# Patient Record
Sex: FEMALE | Race: WHITE | NOT HISPANIC OR LATINO | Employment: FULL TIME | URBAN - NONMETROPOLITAN AREA
[De-identification: names, ages, dates, MRNs, and addresses within clinical notes are randomized per-mention and may not be internally consistent; named-entity substitution may affect disease eponyms.]

---

## 2020-02-21 ENCOUNTER — APPOINTMENT (EMERGENCY)
Dept: CT IMAGING | Facility: HOSPITAL | Age: 60
DRG: 392 | End: 2020-02-21
Payer: COMMERCIAL

## 2020-02-21 ENCOUNTER — HOSPITAL ENCOUNTER (INPATIENT)
Facility: HOSPITAL | Age: 60
LOS: 6 days | Discharge: HOME | DRG: 392 | End: 2020-02-27
Attending: EMERGENCY MEDICINE | Admitting: FAMILY MEDICINE
Payer: COMMERCIAL

## 2020-02-21 DIAGNOSIS — K57.92 DIVERTICULITIS: ICD-10-CM

## 2020-02-21 DIAGNOSIS — D72.829 LEUKOCYTOSIS, UNSPECIFIED TYPE: ICD-10-CM

## 2020-02-21 DIAGNOSIS — R10.32 LEFT LOWER QUADRANT ABDOMINAL PAIN: Primary | ICD-10-CM

## 2020-02-21 DIAGNOSIS — K57.32 SIGMOID DIVERTICULITIS: ICD-10-CM

## 2020-02-21 PROBLEM — R11.0 NAUSEA: Status: ACTIVE | Noted: 2020-02-21

## 2020-02-21 PROBLEM — D72.825 BANDEMIA: Status: ACTIVE | Noted: 2020-02-21

## 2020-02-21 PROBLEM — M48.00 SPINAL STENOSIS: Status: ACTIVE | Noted: 2020-02-21

## 2020-02-21 PROBLEM — M96.1 POST LAMINECTOMY SYNDROME: Status: ACTIVE | Noted: 2020-02-21

## 2020-02-21 LAB
ALBUMIN SERPL BCP-MCNC: 4 G/DL (ref 3.5–5)
ALP SERPL-CCNC: 53 U/L (ref 46–116)
ALT SERPL W P-5'-P-CCNC: 20 U/L (ref 12–78)
AMYLASE SERPL-CCNC: 36 IU/L (ref 25–115)
ANION GAP SERPL CALCULATED.3IONS-SCNC: 8 MMOL/L (ref 4–13)
AST SERPL W P-5'-P-CCNC: 13 U/L (ref 5–45)
BACTERIA UR QL AUTO: NORMAL /HPF
BASOPHILS # BLD AUTO: 0.03 THOUSANDS/ΜL (ref 0–0.1)
BASOPHILS NFR BLD AUTO: 0 % (ref 0–1)
BILIRUB SERPL-MCNC: 0.36 MG/DL (ref 0.2–1)
BILIRUB UR QL STRIP: NEGATIVE
BUN SERPL-MCNC: 14 MG/DL (ref 5–25)
CALCIUM SERPL-MCNC: 8.7 MG/DL (ref 8.3–10.1)
CHLORIDE SERPL-SCNC: 103 MMOL/L (ref 100–108)
CLARITY UR: CLEAR
CO2 SERPL-SCNC: 30 MMOL/L (ref 21–32)
COLOR UR: YELLOW
CREAT SERPL-MCNC: 0.78 MG/DL (ref 0.6–1.3)
EOSINOPHIL # BLD AUTO: 0.01 THOUSAND/ΜL (ref 0–0.61)
EOSINOPHIL NFR BLD AUTO: 0 % (ref 0–6)
ERYTHROCYTE [DISTWIDTH] IN BLOOD BY AUTOMATED COUNT: 11.8 % (ref 11.6–15.1)
GFR SERPL CREATININE-BSD FRML MDRD: 83 ML/MIN/1.73SQ M
GLUCOSE SERPL-MCNC: 107 MG/DL (ref 65–140)
GLUCOSE UR STRIP-MCNC: NEGATIVE MG/DL
HCT VFR BLD AUTO: 46.4 % (ref 34.8–46.1)
HGB BLD-MCNC: 15.7 G/DL (ref 11.5–15.4)
HGB UR QL STRIP.AUTO: ABNORMAL
IMM GRANULOCYTES # BLD AUTO: 0.06 THOUSAND/UL (ref 0–0.2)
IMM GRANULOCYTES NFR BLD AUTO: 0 % (ref 0–2)
INR PPP: 0.91 (ref 0.84–1.19)
KETONES UR STRIP-MCNC: NEGATIVE MG/DL
LACTATE SERPL-SCNC: 1.9 MMOL/L (ref 0.5–2)
LEUKOCYTE ESTERASE UR QL STRIP: NEGATIVE
LIPASE SERPL-CCNC: 159 U/L (ref 73–393)
LYMPHOCYTES # BLD AUTO: 1.01 THOUSANDS/ΜL (ref 0.6–4.47)
LYMPHOCYTES NFR BLD AUTO: 7 % (ref 14–44)
MCH RBC QN AUTO: 31.3 PG (ref 26.8–34.3)
MCHC RBC AUTO-ENTMCNC: 33.8 G/DL (ref 31.4–37.4)
MCV RBC AUTO: 93 FL (ref 82–98)
MONOCYTES # BLD AUTO: 0.83 THOUSAND/ΜL (ref 0.17–1.22)
MONOCYTES NFR BLD AUTO: 6 % (ref 4–12)
NEUTROPHILS # BLD AUTO: 12.46 THOUSANDS/ΜL (ref 1.85–7.62)
NEUTS SEG NFR BLD AUTO: 87 % (ref 43–75)
NITRITE UR QL STRIP: NEGATIVE
NON-SQ EPI CELLS URNS QL MICRO: NORMAL /HPF
NRBC BLD AUTO-RTO: 0 /100 WBCS
PH UR STRIP.AUTO: 6 [PH]
PLATELET # BLD AUTO: 285 THOUSANDS/UL (ref 149–390)
PMV BLD AUTO: 9.4 FL (ref 8.9–12.7)
POTASSIUM SERPL-SCNC: 4 MMOL/L (ref 3.5–5.3)
PROT SERPL-MCNC: 7.5 G/DL (ref 6.4–8.2)
PROT UR STRIP-MCNC: NEGATIVE MG/DL
PROTHROMBIN TIME: 12.2 SECONDS (ref 11.6–14.5)
RBC # BLD AUTO: 5.01 MILLION/UL (ref 3.81–5.12)
RBC #/AREA URNS AUTO: NORMAL /HPF
SODIUM SERPL-SCNC: 141 MMOL/L (ref 136–145)
SP GR UR STRIP.AUTO: 1.01 (ref 1–1.03)
UROBILINOGEN UR QL STRIP.AUTO: 0.2 E.U./DL
WBC # BLD AUTO: 14.4 THOUSAND/UL (ref 4.31–10.16)
WBC #/AREA URNS AUTO: NORMAL /HPF

## 2020-02-21 PROCEDURE — 83605 ASSAY OF LACTIC ACID: CPT | Performed by: EMERGENCY MEDICINE

## 2020-02-21 PROCEDURE — 74177 CT ABD & PELVIS W/CONTRAST: CPT

## 2020-02-21 PROCEDURE — 36415 COLL VENOUS BLD VENIPUNCTURE: CPT

## 2020-02-21 PROCEDURE — C9113 INJ PANTOPRAZOLE SODIUM, VIA: HCPCS | Performed by: FAMILY MEDICINE

## 2020-02-21 PROCEDURE — 85610 PROTHROMBIN TIME: CPT | Performed by: EMERGENCY MEDICINE

## 2020-02-21 PROCEDURE — 80053 COMPREHEN METABOLIC PANEL: CPT

## 2020-02-21 PROCEDURE — 85025 COMPLETE CBC W/AUTO DIFF WBC: CPT

## 2020-02-21 PROCEDURE — 82150 ASSAY OF AMYLASE: CPT | Performed by: FAMILY MEDICINE

## 2020-02-21 PROCEDURE — 99222 1ST HOSP IP/OBS MODERATE 55: CPT | Performed by: FAMILY MEDICINE

## 2020-02-21 PROCEDURE — 96375 TX/PRO/DX INJ NEW DRUG ADDON: CPT

## 2020-02-21 PROCEDURE — 99285 EMERGENCY DEPT VISIT HI MDM: CPT | Performed by: EMERGENCY MEDICINE

## 2020-02-21 PROCEDURE — 83690 ASSAY OF LIPASE: CPT

## 2020-02-21 PROCEDURE — 99285 EMERGENCY DEPT VISIT HI MDM: CPT

## 2020-02-21 PROCEDURE — 81001 URINALYSIS AUTO W/SCOPE: CPT

## 2020-02-21 PROCEDURE — 96365 THER/PROPH/DIAG IV INF INIT: CPT

## 2020-02-21 PROCEDURE — 96361 HYDRATE IV INFUSION ADD-ON: CPT

## 2020-02-21 RX ORDER — SODIUM CHLORIDE 9 MG/ML
125 INJECTION, SOLUTION INTRAVENOUS CONTINUOUS
Status: DISCONTINUED | OUTPATIENT
Start: 2020-02-21 | End: 2020-02-26

## 2020-02-21 RX ORDER — ONDANSETRON 2 MG/ML
4 INJECTION INTRAMUSCULAR; INTRAVENOUS ONCE AS NEEDED
Status: DISCONTINUED | OUTPATIENT
Start: 2020-02-21 | End: 2020-02-23

## 2020-02-21 RX ORDER — ONDANSETRON 2 MG/ML
4 INJECTION INTRAMUSCULAR; INTRAVENOUS EVERY 6 HOURS PRN
Status: DISCONTINUED | OUTPATIENT
Start: 2020-02-21 | End: 2020-02-27 | Stop reason: HOSPADM

## 2020-02-21 RX ORDER — OXYCODONE HYDROCHLORIDE 5 MG/1
10 TABLET ORAL DAILY
COMMUNITY
Start: 2020-01-30 | End: 2020-07-17

## 2020-02-21 RX ORDER — MORPHINE SULFATE 4 MG/ML
4 INJECTION, SOLUTION INTRAMUSCULAR; INTRAVENOUS EVERY 6 HOURS PRN
Status: COMPLETED | OUTPATIENT
Start: 2020-02-21 | End: 2020-02-22

## 2020-02-21 RX ORDER — SODIUM CHLORIDE 9 MG/ML
75 INJECTION, SOLUTION INTRAVENOUS CONTINUOUS
Status: DISCONTINUED | OUTPATIENT
Start: 2020-02-21 | End: 2020-02-21 | Stop reason: SDUPTHER

## 2020-02-21 RX ORDER — CIPROFLOXACIN 2 MG/ML
400 INJECTION, SOLUTION INTRAVENOUS ONCE
Status: COMPLETED | OUTPATIENT
Start: 2020-02-21 | End: 2020-02-21

## 2020-02-21 RX ORDER — PANTOPRAZOLE SODIUM 40 MG/1
40 INJECTION, POWDER, FOR SOLUTION INTRAVENOUS
Status: DISCONTINUED | OUTPATIENT
Start: 2020-02-21 | End: 2020-02-27

## 2020-02-21 RX ORDER — ACETAMINOPHEN 325 MG/1
650 TABLET ORAL EVERY 6 HOURS PRN
Status: DISCONTINUED | OUTPATIENT
Start: 2020-02-21 | End: 2020-02-27 | Stop reason: HOSPADM

## 2020-02-21 RX ORDER — OXYCODONE HYDROCHLORIDE 10 MG/1
10 TABLET ORAL DAILY
Status: DISCONTINUED | OUTPATIENT
Start: 2020-02-21 | End: 2020-02-27 | Stop reason: HOSPADM

## 2020-02-21 RX ORDER — ONDANSETRON 2 MG/ML
4 INJECTION INTRAMUSCULAR; INTRAVENOUS ONCE
Status: COMPLETED | OUTPATIENT
Start: 2020-02-21 | End: 2020-02-21

## 2020-02-21 RX ORDER — MORPHINE SULFATE 4 MG/ML
4 INJECTION, SOLUTION INTRAMUSCULAR; INTRAVENOUS ONCE
Status: COMPLETED | OUTPATIENT
Start: 2020-02-21 | End: 2020-02-21

## 2020-02-21 RX ORDER — OXYCODONE HYDROCHLORIDE 5 MG/1
5 TABLET ORAL ONCE
Status: COMPLETED | OUTPATIENT
Start: 2020-02-21 | End: 2020-02-21

## 2020-02-21 RX ADMIN — MORPHINE SULFATE 4 MG: 4 INJECTION INTRAVENOUS at 09:38

## 2020-02-21 RX ADMIN — ONDANSETRON 4 MG: 2 INJECTION INTRAMUSCULAR; INTRAVENOUS at 09:38

## 2020-02-21 RX ADMIN — SODIUM CHLORIDE 125 ML/HR: 0.9 INJECTION, SOLUTION INTRAVENOUS at 23:50

## 2020-02-21 RX ADMIN — METRONIDAZOLE 500 MG: 500 INJECTION, SOLUTION INTRAVENOUS at 14:35

## 2020-02-21 RX ADMIN — ENOXAPARIN SODIUM 40 MG: 40 INJECTION SUBCUTANEOUS at 14:36

## 2020-02-21 RX ADMIN — IOHEXOL 100 ML: 350 INJECTION, SOLUTION INTRAVENOUS at 10:09

## 2020-02-21 RX ADMIN — CIPROFLOXACIN 400 MG: 2 INJECTION, SOLUTION INTRAVENOUS at 11:13

## 2020-02-21 RX ADMIN — PIPERACILLIN AND TAZOBACTAM 3.38 G: 3; .375 INJECTION, POWDER, LYOPHILIZED, FOR SOLUTION INTRAVENOUS at 17:06

## 2020-02-21 RX ADMIN — OXYCODONE HYDROCHLORIDE 5 MG: 5 TABLET ORAL at 21:56

## 2020-02-21 RX ADMIN — SODIUM CHLORIDE 1000 ML: 0.9 INJECTION, SOLUTION INTRAVENOUS at 11:12

## 2020-02-21 RX ADMIN — PIPERACILLIN AND TAZOBACTAM 3.38 G: 3; .375 INJECTION, POWDER, LYOPHILIZED, FOR SOLUTION INTRAVENOUS at 23:50

## 2020-02-21 RX ADMIN — METRONIDAZOLE 500 MG: 500 INJECTION, SOLUTION INTRAVENOUS at 20:55

## 2020-02-21 RX ADMIN — OXYCODONE HYDROCHLORIDE 10 MG: 10 TABLET ORAL at 14:36

## 2020-02-21 RX ADMIN — SODIUM CHLORIDE 1000 ML: 0.9 INJECTION, SOLUTION INTRAVENOUS at 09:37

## 2020-02-21 RX ADMIN — SODIUM CHLORIDE 125 ML/HR: 0.9 INJECTION, SOLUTION INTRAVENOUS at 14:37

## 2020-02-21 RX ADMIN — PANTOPRAZOLE SODIUM 40 MG: 40 INJECTION, POWDER, FOR SOLUTION INTRAVENOUS at 14:36

## 2020-02-21 NOTE — H&P
H&P- Idalia Agarwal 1960, 62 y o  female MRN: [id number]    Unit/Bed#: [facility] Encounter: [id number]    Primary Care Provider: Maru Ramírez MD   Date and time admitted to hospital: 2/21/2020  9:12 AM        * Sigmoid diverticulitis  Assessment & Plan  Sudden onset  As per CT scan of abdomen  No signs symptoms of abscess  Continue clear liquid  Continue antibiotic therapy  Patient received 1 dose of metronidazole and ciprofloxacin in the ER, received bolus IV  Lactic acid normal  WBC elevated  See urine abdominal exam    Nausea  Assessment & Plan  Associated with sigmoid diverticulitis  Clear liquid  Continue antiemetics    Bandemia  Assessment & Plan  Most likely secondary to sigmoid diverticulitis  Continue antibiotic therapy for sigmoid diverticulitis  Serial exam  Monitor labs  Monitor vital    Spinal stenosis  Assessment & Plan  As per patient, her whole spine is affected  Patient has titanium rods in cervical spine- NO MRI  Continue pain management    Post laminectomy syndrome  Assessment & Plan  Continue conservative management  Continue pain medication for pain management        VTE Prophylaxis: Enoxaparin (Lovenox)  / sequential compression device   Code Status:  Full code    Discussion with family:  None    Anticipated Length of Stay:  Patient will be admitted on an Inpatient basis with an anticipated length of stay of  > 2 midnights  Justification for Hospital Stay:  Sigmoid diverticulitis, leukocytosis, nausea,    Total Time for Visit, including Counseling / Coordination of Care: 30 minutes  Greater than 50% of this total time spent on direct patient counseling and coordination of care  Chief Complaint:   Abdominal pain    History of Present Illness:    Idalia Agarwal is a 62 y o  female who presents with abdominal pain    As per patient, she woke up early morning with abdominal pain, located in the left lower quadrant, nonradiating, 10/10, no significant elevating factors, denies any aggravating factors  Associated with nausea  Denies any constipation, any diarrhea, any urinary discomfort, any fever  Review of Systems:    Review of Systems   Constitutional: Positive for activity change and appetite change  Negative for chills, diaphoresis, fatigue, fever and unexpected weight change  HENT: Negative for sore throat and trouble swallowing  Eyes: Negative for photophobia, redness and visual disturbance  Respiratory: Negative  Negative for apnea, cough, choking, chest tightness and shortness of breath  Cardiovascular: Negative for chest pain, palpitations and leg swelling  Gastrointestinal: Positive for abdominal pain and nausea  Negative for abdominal distention, anal bleeding, blood in stool, constipation, diarrhea, rectal pain and vomiting  Genitourinary: Negative for difficulty urinating, dyspareunia, dysuria, enuresis and flank pain  Musculoskeletal: Positive for arthralgias, back pain and neck pain  Skin: Negative for color change, pallor, rash and wound  Neurological: Negative for dizziness, seizures, facial asymmetry, light-headedness, numbness and headaches  Hematological: Negative for adenopathy  Psychiatric/Behavioral: Negative for agitation  All other systems reviewed and are negative  Past Medical and Surgical History:     Past Medical History:   Diagnosis Date    Diverticulitis     Spinal stenosis        Past Surgical History:   Procedure Laterality Date    CARPAL TUNNEL RELEASE      CERVICAL FUSION       SECTION      HYSTERECTOMY      NECK SURGERY      TONSILLECTOMY         Meds/Allergies:    Prior to Admission medications    Medication Sig Start Date End Date Taking? Authorizing Provider   oxyCODONE (Roxicodone) 5 mg immediate release tablet Take 10 mg by mouth daily 20  Yes Historical Provider, MD     I have reviewed home medications with patient personally  Allergies:    Allergies   Allergen Reactions    Ibuprofen Nausea, vomiting  gi symtoms      Nsaids      nausea    Pollen Extract      congestion       Social History:     Marital Status: Single   Occupation:  Employed  Patient Pre-hospital Living Situation:  At home  Patient Pre-hospital Level of Mobility:  Independent  Patient Pre-hospital Diet Restrictions:  None  Substance Use History:   Social History     Substance and Sexual Activity   Alcohol Use Not Currently     Social History     Tobacco Use   Smoking Status Current Every Day Smoker    Packs/day: 0 50    Years: 0 00    Pack years: 0 00   Smokeless Tobacco Never Used     Social History     Substance and Sexual Activity   Drug Use Not Currently       Family History:    non-contributory    Physical Exam:     Vitals:   Blood Pressure: 135/63 (02/21/20 1130)  Pulse: 81 (02/21/20 1145)  Temperature: 98 2 °F (36 8 °C) (02/21/20 0918)  Temp Source: Temporal (02/21/20 0918)  Respirations: 16 (02/21/20 1145)  Height: 5' 6" (167 6 cm) (02/21/20 0918)  Weight - Scale: 67 3 kg (148 lb 5 9 oz) (02/21/20 0918)  SpO2: 97 % (02/21/20 1145)    Physical Exam   Constitutional: She is oriented to person, place, and time  She appears well-developed  In pain and discomfort   HENT:   Mouth/Throat: Oropharynx is clear and moist  No oropharyngeal exudate  Eyes: Pupils are equal, round, and reactive to light  Conjunctivae and EOM are normal  No scleral icterus  Neck: Neck supple  No JVD present  Cardiovascular: Normal rate  Exam reveals no gallop and no friction rub  No murmur heard  Pulmonary/Chest: Effort normal and breath sounds normal  No stridor  No respiratory distress  She has no wheezes  She has no rales  Abdominal: Soft  Bowel sounds are normal  She exhibits no distension and no mass  There is tenderness in the left lower quadrant  There is guarding  There is no rigidity, no rebound and no tenderness at McBurney's point  No hernia  Hernia confirmed negative in the ventral area     Musculoskeletal: Normal range of motion  She exhibits no edema  Neurological: She is alert and oriented to person, place, and time  She displays normal reflexes  No cranial nerve deficit or sensory deficit  She exhibits normal muscle tone  Coordination normal    Skin: Skin is warm  Capillary refill takes less than 2 seconds  Psychiatric: She has a normal mood and affect  Nursing note and vitals reviewed  Additional Data:     Lab Results: I have personally reviewed pertinent reports  Results from last 7 days   Lab Units 02/21/20  0932   WBC Thousand/uL 14 40*   HEMOGLOBIN g/dL 15 7*   HEMATOCRIT % 46 4*   PLATELETS Thousands/uL 285   NEUTROS PCT % 87*   LYMPHS PCT % 7*   MONOS PCT % 6   EOS PCT % 0     Results from last 7 days   Lab Units 02/21/20  0932   SODIUM mmol/L 141   POTASSIUM mmol/L 4 0   CHLORIDE mmol/L 103   CO2 mmol/L 30   BUN mg/dL 14   CREATININE mg/dL 0 78   ANION GAP mmol/L 8   CALCIUM mg/dL 8 7   ALBUMIN g/dL 4 0   TOTAL BILIRUBIN mg/dL 0 36   ALK PHOS U/L 53   ALT U/L 20   AST U/L 13   GLUCOSE RANDOM mg/dL 107     Results from last 7 days   Lab Units 02/21/20  0932   INR  0 91             Results from last 7 days   Lab Units 02/21/20  0932   LACTIC ACID mmol/L 1 9       Imaging: I have personally reviewed pertinent reports  CT abdomen pelvis with contrast   Final Result by Massiel Whitley MD (02/21 1037)      Findings consistent with sigmoid diverticulitis, with either a large inflamed diverticulum or small debris-containing focal collection (2 3 x 2 x 1 8 cm; images 2/57 and 602/92)  No free intraperitoneal air or drainable focal fluid collection  The study was marked in [facility] for immediate notification  Workstation performed: [id number]             EKG, Pathology, and Other Studies Reviewed on Admission:   · EKG:  Reviewed    Allscripts / Epic Records Reviewed: Yes     ** Please Note: This note has been constructed using a voice recognition system   **

## 2020-02-21 NOTE — ASSESSMENT & PLAN NOTE
Sudden onset  As per CT scan of abdomen  No signs symptoms of abscess  Continue clear liquid  Continue antibiotic therapy  Patient received 1 dose of metronidazole and ciprofloxacin in the ER, received bolus IV  Lactic acid normal  WBC elevated  See urine abdominal exam

## 2020-02-21 NOTE — PLAN OF CARE
Problem: Nutrition/Hydration-ADULT  Goal: Nutrient/Hydration intake appropriate for improving, restoring or maintaining nutritional needs  Description  Monitor and assess patient's nutrition/hydration status for malnutrition  Collaborate with interdisciplinary team and initiate plan and interventions as ordered  Monitor patient's weight and dietary intake as ordered or per policy  Utilize nutrition screening tool and intervene as necessary  Determine patient's food preferences and provide high-protein, high-caloric foods as appropriate  INTERVENTIONS:  - Monitor oral intake, urinary output, labs, and treatment plans  - Assess nutrition and hydration status and recommend course of action  - Evaluate amount of meals eaten  - Assist patient with eating if necessary   - Allow adequate time for meals  - Recommend/ encourage appropriate diets, oral nutritional supplements, and vitamin/mineral supplements  - Order, calculate, and assess calorie counts as needed  - Recommend, monitor, and adjust tube feedings and TPN/PPN based on assessed needs  - Assess need for intravenous fluids  - Provide specific nutrition/hydration education as appropriate  - Include patient/family/caregiver in decisions related to nutrition  Outcome: Progressing     Problem: Nutrition/Hydration-ADULT  Goal: Nutrient/Hydration intake appropriate for improving, restoring or maintaining nutritional needs  Description  Monitor and assess patient's nutrition/hydration status for malnutrition  Collaborate with interdisciplinary team and initiate plan and interventions as ordered  Monitor patient's weight and dietary intake as ordered or per policy  Utilize nutrition screening tool and intervene as necessary  Determine patient's food preferences and provide high-protein, high-caloric foods as appropriate       INTERVENTIONS:  - Monitor oral intake, urinary output, labs, and treatment plans  - Assess nutrition and hydration status and recommend course of action  - Evaluate amount of meals eaten  - Assist patient with eating if necessary   - Allow adequate time for meals  - Recommend/ encourage appropriate diets, oral nutritional supplements, and vitamin/mineral supplements  - Order, calculate, and assess calorie counts as needed  - Recommend, monitor, and adjust tube feedings and TPN/PPN based on assessed needs  - Assess need for intravenous fluids  - Provide specific nutrition/hydration education as appropriate  - Include patient/family/caregiver in decisions related to nutrition  Outcome: Progressing     Problem: PAIN - ADULT  Goal: Verbalizes/displays adequate comfort level or baseline comfort level  Description  Interventions:  - Encourage patient to monitor pain and request assistance  - Assess pain using appropriate pain scale  - Administer analgesics based on type and severity of pain and evaluate response  - Implement non-pharmacological measures as appropriate and evaluate response  - Consider cultural and social influences on pain and pain management  - Notify physician/advanced practitioner if interventions unsuccessful or patient reports new pain  Outcome: Progressing     Problem: INFECTION - ADULT  Goal: Absence or prevention of progression during hospitalization  Description  INTERVENTIONS:  - Assess and monitor for signs and symptoms of infection  - Monitor lab/diagnostic results  - Monitor all insertion sites, i e  indwelling lines, tubes, and drains  - Monitor endotracheal if appropriate and nasal secretions for changes in amount and color  - [place] appropriate cooling/warming therapies per order  - Administer medications as ordered  - Instruct and encourage patient and family to use good hand hygiene technique  - Identify and instruct in appropriate isolation precautions for identified infection/condition  Outcome: Progressing  Goal: Absence of fever/infection during neutropenic period  Description  INTERVENTIONS:  - Monitor WBC    Outcome: Progressing     Problem: SAFETY ADULT  Goal: Patient will remain free of falls  Description  INTERVENTIONS:  - Assess patient frequently for physical needs  -  Identify cognitive and physical deficits and behaviors that affect risk of falls    -  [place] fall precautions as indicated by assessment   - Educate patient/family on patient safety including physical limitations  - Instruct patient to call for assistance with activity based on assessment  - Modify environment to reduce risk of injury  - Consider OT/PT consult to assist with strengthening/mobility  Outcome: Progressing  Goal: Maintain or return to baseline ADL function  Description  INTERVENTIONS:  -  Assess patient's ability to carry out ADLs; assess patient's baseline for ADL function and identify physical deficits which impact ability to perform ADLs (bathing, care of mouth/teeth, toileting, grooming, dressing, etc )  - Assess/evaluate cause of self-care deficits   - Assess range of motion  - Assess patient's mobility; develop plan if impaired  - Assess patient's need for assistive devices and provide as appropriate  - Encourage maximum independence but intervene and supervise when necessary  - Involve family in performance of ADLs  - Assess for home care needs following discharge   - Consider OT consult to assist with ADL evaluation and planning for discharge  - Provide patient education as appropriate  Outcome: Progressing  Goal: Maintain or return mobility status to optimal level  Description  INTERVENTIONS:  - Assess patient's baseline mobility status (ambulation, transfers, stairs, etc )    - Identify cognitive and physical deficits and behaviors that affect mobility  - Identify mobility aids required to assist with transfers and/or ambulation (gait belt, sit-to-stand, lift, walker, cane, etc )  - [place] fall precautions as indicated by assessment  - Record patient progress and toleration of activity level on Mobility SBAR; progress patient to next Phase/Stage  - Instruct patient to call for assistance with activity based on assessment  - Consider rehabilitation consult to assist with strengthening/weightbearing, etc   Outcome: Progressing     Problem: SAFETY ADULT  Goal: Patient will remain free of falls  Description  INTERVENTIONS:  - Assess patient frequently for physical needs  -  Identify cognitive and physical deficits and behaviors that affect risk of falls    -  [place] fall precautions as indicated by assessment   - Educate patient/family on patient safety including physical limitations  - Instruct patient to call for assistance with activity based on assessment  - Modify environment to reduce risk of injury  - Consider OT/PT consult to assist with strengthening/mobility  Outcome: Progressing  Goal: Maintain or return to baseline ADL function  Description  INTERVENTIONS:  -  Assess patient's ability to carry out ADLs; assess patient's baseline for ADL function and identify physical deficits which impact ability to perform ADLs (bathing, care of mouth/teeth, toileting, grooming, dressing, etc )  - Assess/evaluate cause of self-care deficits   - Assess range of motion  - Assess patient's mobility; develop plan if impaired  - Assess patient's need for assistive devices and provide as appropriate  - Encourage maximum independence but intervene and supervise when necessary  - Involve family in performance of ADLs  - Assess for home care needs following discharge   - Consider OT consult to assist with ADL evaluation and planning for discharge  - Provide patient education as appropriate  Outcome: Progressing  Goal: Maintain or return mobility status to optimal level  Description  INTERVENTIONS:  - Assess patient's baseline mobility status (ambulation, transfers, stairs, etc )    - Identify cognitive and physical deficits and behaviors that affect mobility  - Identify mobility aids required to assist with transfers and/or ambulation (gait belt, sit-to-stand, lift, walker, cane, etc )  - [place] fall precautions as indicated by assessment  - Record patient progress and toleration of activity level on Mobility SBAR; progress patient to next Phase/Stage  - Instruct patient to call for assistance with activity based on assessment  - Consider rehabilitation consult to assist with strengthening/weightbearing, etc   Outcome: Progressing     Problem: DISCHARGE PLANNING  Goal: Discharge to home or other facility with appropriate resources  Description  INTERVENTIONS:  - Identify barriers to discharge w/patient and caregiver  - Arrange for needed discharge resources and transportation as appropriate  - Identify discharge learning needs (meds, wound care, etc )  - Arrange for interpretive services to assist at discharge as needed  - Refer to Case Management Department for coordinating discharge planning if the patient needs post-hospital services based on physician/advanced practitioner order or complex needs related to functional status, cognitive ability, or social support system  Outcome: Progressing

## 2020-02-21 NOTE — LETTER
[facility]  [address]  [facility] [address]  Dept: [phone]    February 27, 2020     Patient: Chelsie Anne   YOB: 1960   Date of Visit: 2/21/2020       To Whom it May Concern:    Tyler Caro is under my professional care  She was seen in the hospital from 2/21/2020   to 02/27/20  She may return to work on March 5th 2020 without limitations  If you have any questions or concerns, please don't hesitate to call           Sincerely,          Sourav Sommers, DO

## 2020-02-21 NOTE — ASSESSMENT & PLAN NOTE
Most likely secondary to sigmoid diverticulitis  Continue antibiotic therapy for sigmoid diverticulitis  Serial exam  Monitor labs  Monitor vital

## 2020-02-21 NOTE — ED PROVIDER NOTES
History  Chief Complaint   Patient presents with    Abdominal Pain     pt reports LLQ abdominal pain since 1am this morning  last BM this morning, pt denies blood in stool  pt reports HX of diverticulitis     64-year-old female with a past medical history of diverticulitis and spinal stenosis presents with abdominal pain, dysuria and nausea since 1:00 a m today  Patient states she was awakened with severe left lower quadrant pain that is described as cramping and sharp, nonradiating, 10/10 which has been intermittent since that time  Patient has a history of diverticulitis but has never had pain like this before  She went to work at 5:00 a m  At [employer] but denies heavy lifting or exertional activity  No previous history of hernia, AAA, nephrolithiasis, pyelonephritis, colorectal cancer or GI bleeding  Patient states she took Percocet 5 mg at 2:30 a m  And another Percocet 5 mg at 6:00 a m  Without improvement; patient is on Percocet for her chronic degenerative disc disease and spinal stenosis  Her primary care provider is her prescriber and she does not see pain management  Patient states she had a colonoscopy completed by Dr Dong Bird which showed diverticulitis, however, she has never had antibiotic treatment or surgery  Patient denies hematochezia, melena, vomiting, shortness of breath, chest pain, back pain, rash, urinary urgency or frequency, constipation        History provided by:  Patient   used: No    Abdominal Pain   Pain location:  LLQ  Pain quality: bloating, cramping and sharp    Pain radiates to:  Does not radiate  Pain severity:  Severe  Onset quality:  Sudden  Duration:  8 hours  Timing:  Intermittent  Progression:  Waxing and waning  Chronicity:  New  Context: awakening from sleep    Context: not alcohol use, not diet changes, not eating, not laxative use, not medication withdrawal, not previous surgeries, not recent illness, not recent sexual activity, not recent travel, not retching, not sick contacts, not suspicious food intake and not trauma    Relieved by:  Nothing  Worsened by:  Palpation and position changes  Associated symptoms: dysuria and nausea    Associated symptoms: no anorexia, no belching, no chest pain, no chills, no constipation, no cough, no diarrhea, no fatigue, no fever, no flatus, no hematemesis, no hematochezia, no hematuria, no melena, no shortness of breath, no sore throat, no vaginal bleeding, no vaginal discharge and no vomiting    Risk factors: being elderly and multiple surgeries    Risk factors: no alcohol abuse, no aspirin use, no NSAID use, not obese, not pregnant and no recent hospitalization        Prior to Admission Medications   Prescriptions Last Dose Informant Patient Reported? Taking?   oxyCODONE (Roxicodone) 5 mg immediate release tablet 2020 at Unknown time  Yes Yes   Sig: Take 10 mg by mouth daily      Facility-Administered Medications: None       Past Medical History:   Diagnosis Date    Diverticulitis     Spinal stenosis        Past Surgical History:   Procedure Laterality Date    CARPAL TUNNEL RELEASE      CERVICAL FUSION       SECTION      HYSTERECTOMY      NECK SURGERY      TONSILLECTOMY         History reviewed  No pertinent family history  I have reviewed and agree with the history as documented  Social History     Tobacco Use    Smoking status: Current Every Day Smoker     Packs/day: 0 50     Years: 0 00     Pack years: 0 00    Smokeless tobacco: Never Used   Substance Use Topics    Alcohol use: Not Currently    Drug use: Not Currently       Review of Systems   Constitutional: Negative for chills, diaphoresis, fatigue and fever  HENT: Negative for congestion, drooling, facial swelling, nosebleeds, sneezing, sore throat, trouble swallowing and voice change  Eyes: Negative for photophobia, pain and visual disturbance     Respiratory: Negative for cough, chest tightness, shortness of breath and wheezing  Cardiovascular: Negative for chest pain, palpitations and leg swelling  Gastrointestinal: Positive for abdominal pain and nausea  Negative for abdominal distention, anal bleeding, anorexia, blood in stool, constipation, diarrhea, flatus, hematemesis, hematochezia, melena, rectal pain and vomiting  Genitourinary: Positive for dysuria  Negative for decreased urine volume, difficulty urinating, dyspareunia, flank pain, frequency, hematuria, pelvic pain, urgency, vaginal bleeding and vaginal discharge  Musculoskeletal: Negative for arthralgias, back pain, gait problem, joint swelling, myalgias, neck pain and neck stiffness  Skin: Negative for color change, pallor, rash and wound  Allergic/Immunologic: Negative for immunocompromised state  Neurological: Negative for dizziness, tremors, seizures, syncope, facial asymmetry, speech difficulty, weakness, light-headedness, numbness and headaches  Hematological: Negative for adenopathy  Psychiatric/Behavioral: Negative for agitation, confusion, hallucinations and suicidal ideas  The patient is not nervous/anxious  Physical Exam  Physical Exam   Constitutional: She is oriented to person, place, and time  Vital signs are normal  She appears well-developed and well-nourished  She is cooperative  Non-toxic appearance  She does not have a sickly appearance  She appears ill  No distress  Appears uncomfortable   HENT:   Head: Normocephalic and atraumatic  Right Ear: Hearing, tympanic membrane, external ear and ear canal normal    Left Ear: Hearing, tympanic membrane, external ear and ear canal normal    Nose: Nose normal  Right sinus exhibits no maxillary sinus tenderness and no frontal sinus tenderness  Left sinus exhibits no maxillary sinus tenderness and no frontal sinus tenderness     Mouth/Throat: Uvula is midline, oropharynx is clear and moist and mucous membranes are normal  No oropharyngeal exudate, posterior oropharyngeal edema, posterior oropharyngeal erythema or tonsillar abscesses  Eyes: Pupils are equal, round, and reactive to light  Conjunctivae, EOM and lids are normal    Neck: Trachea normal, normal range of motion, full passive range of motion without pain and phonation normal  Neck supple  No thyroid mass and no thyromegaly present  Cardiovascular: Normal rate, regular rhythm, S1 normal, S2 normal, normal heart sounds, intact distal pulses and normal pulses  Pulses:       Radial pulses are 2+ on the right side, and 2+ on the left side  Dorsalis pedis pulses are 2+ on the right side, and 2+ on the left side  Pulmonary/Chest: Effort normal and breath sounds normal  No accessory muscle usage or stridor  No tachypnea  No respiratory distress  She has no decreased breath sounds  She has no wheezes  She has no rhonchi  She has no rales  She exhibits no mass, no tenderness, no deformity and no retraction  Abdominal: Soft  Bowel sounds are normal  She exhibits no distension, no ascites and no mass  There is no hepatosplenomegaly  There is tenderness in the left lower quadrant  There is no rigidity, no rebound, no guarding, no CVA tenderness, no tenderness at McBurney's point and negative Heredia's sign  No hernia  Musculoskeletal: Normal range of motion  She exhibits no edema, tenderness or deformity  Lymphadenopathy:     She has no cervical adenopathy  Neurological: She is alert and oriented to person, place, and time  She has normal strength  She is not disoriented  She displays no atrophy and no tremor  No cranial nerve deficit or sensory deficit  She exhibits normal muscle tone  She displays a negative Romberg sign  She displays no seizure activity  Coordination and gait normal  GCS eye subscore is 4  GCS verbal subscore is 5  GCS motor subscore is 6     Patient is AAOx4, GCS 15; speaking clearly and appropriately; motor and sensation intact; visual fields intact; cranial nerves II-XII grossly intact; no facial droop, slurred speech or arm drift   Skin: Skin is warm, dry and intact  Capillary refill takes less than 2 seconds  No abrasion, no bruising, no burn, no ecchymosis, no laceration, no lesion, no petechiae and no rash noted  Rash is not maculopapular  She is not diaphoretic  No cyanosis or erythema  No pallor  Psychiatric: She has a normal mood and affect  Her speech is normal and behavior is normal  Judgment and thought content normal  She is not actively hallucinating  Cognition and memory are normal  She is attentive  Nursing note and vitals reviewed        Vital Signs  ED Triage Vitals [02/21/20 0918]   Temperature Pulse Respirations Blood Pressure SpO2   98 2 °F (36 8 °C) 72 16 136/65 97 %      Temp Source Heart Rate Source Patient Position - Orthostatic VS BP Location FiO2 (%)   Temporal Monitor Lying -- --      Pain Score       Worst Possible Pain           Vitals:    02/22/20 0736 02/22/20 0954 02/22/20 1438 02/22/20 1855   BP: (!) 96/48 99/50 114/55 116/55   Pulse: 73 80 78 81   Patient Position - Orthostatic VS:             Visual Acuity      ED Medications  Medications   ondansetron (ZOFRAN) injection 4 mg (has no administration in time range)   oxyCODONE (ROXICODONE) immediate release tablet 10 mg (10 mg Oral Given 2/22/20 0806)   sodium chloride 0 9 % infusion (125 mL/hr Intravenous New Bag 2/22/20 1301)   piperacillin-tazobactam (ZOSYN) 3 375 g in sodium chloride 0 9 % 100 mL IVPB (3 375 g Intravenous New Bag 2/22/20 1702)     And   metroNIDAZOLE (FLAGYL) IVPB (premix) 500 mg (500 mg Intravenous New Bag 2/22/20 1133)   ondansetron (ZOFRAN) injection 4 mg (has no administration in time range)   enoxaparin (LOVENOX) subcutaneous injection 40 mg (40 mg Subcutaneous Given 2/22/20 0806)   pantoprazole (PROTONIX) injection 40 mg (40 mg Intravenous Given 2/22/20 0806)   acetaminophen (TYLENOL) tablet 650 mg (650 mg Oral Not Given 2/21/20 2104)   HYDROmorphone (DILAUDID) injection 1 mg (1 mg Intravenous Given 2/22/20 1701)   sodium chloride 0 9 % bolus 1,000 mL (0 mL Intravenous Stopped 2/21/20 1040)   morphine (PF) 4 mg/mL injection 4 mg (4 mg Intravenous Given 2/21/20 0938)   ondansetron (ZOFRAN) injection 4 mg (4 mg Intravenous Given 2/21/20 0938)   iohexol (OMNIPAQUE) 350 MG/ML injection (MULTI-DOSE) 100 mL (100 mL Intravenous Given 2/21/20 1009)   metroNIDAZOLE (FLAGYL) IVPB (premix) 500 mg (500 mg Intravenous New Bag 2/21/20 1435)   ciprofloxacin (CIPRO) IVPB (premix) 400 mg (400 mg Intravenous New Bag 2/21/20 1113)   sodium chloride 0 9 % bolus 1,000 mL (1,000 mL Intravenous New Bag 2/21/20 1112)   morphine (PF) 4 mg/mL injection 4 mg (4 mg Intravenous Given 2/22/20 0423)   oxyCODONE (ROXICODONE) IR tablet 5 mg (5 mg Oral Given 2/21/20 2156)   simethicone (MYLICON) chewable tablet 80 mg (80 mg Oral Given 2/22/20 0338)   potassium chloride 40 mEq IVPB (premix) (40 mEq Intravenous New Bag 2/22/20 1008)   sodium chloride 0 9 % bolus 500 mL (500 mL Intravenous New Bag 2/22/20 1008)       Diagnostic Studies  Results Reviewed     Procedure Component Value Units Date/Time    Urine Microscopic [[id number]]  (Normal) Collected:  02/21/20 1039    Lab Status:  Final result Specimen:  Urine, Clean Catch Updated:  02/21/20 1051     RBC, UA None Seen /hpf      WBC, UA None Seen /hpf      Epithelial Cells None Seen /hpf      Bacteria, UA None Seen /hpf     UA w Reflex to Microscopic w Reflex to Culture [[id number]]  (Abnormal) Collected:  02/21/20 1039    Lab Status:  Final result Specimen:  Urine, Clean Catch Updated:  02/21/20 1046     Color, UA Yellow     Clarity, UA Clear     Specific Gravity, UA 1 010     pH, UA 6 0     Leukocytes, UA Negative     Nitrite, UA Negative     Protein, UA Negative mg/dl      Glucose, UA Negative mg/dl      Ketones, UA Negative mg/dl      Urobilinogen, UA 0 2 E U /dl      Bilirubin, UA Negative     Blood, UA Trace-lysed    Lactic acid, plasma [[id number]]  (Normal) Collected:  02/21/20 0932 Lab Status:  Final result Specimen:  Blood from Arm, Right Updated:  02/21/20 1008     LACTIC ACID 1 9 mmol/L     Narrative:       Result may be elevated if tourniquet was used during collection      Comprehensive metabolic panel [[id number]] Collected:  02/21/20 0932    Lab Status:  Final result Specimen:  Blood from Arm, Right Updated:  02/21/20 1003     Sodium 141 mmol/L      Potassium 4 0 mmol/L      Chloride 103 mmol/L      CO2 30 mmol/L      ANION GAP 8 mmol/L      BUN 14 mg/dL      Creatinine 0 78 mg/dL      Glucose 107 mg/dL      Calcium 8 7 mg/dL      AST 13 U/L      ALT 20 U/L      Alkaline Phosphatase 53 U/L      Total Protein 7 5 g/dL      Albumin 4 0 g/dL      Total Bilirubin 0 36 mg/dL      eGFR 83 ml/min/1 73sq m     Narrative:       [place] guidelines for Chronic Kidney Disease (CKD):     Stage 1 with normal or high GFR (GFR > 90 mL/min/1 73 square meters)    Stage 2 Mild CKD (GFR = 60-89 mL/min/1 73 square meters)    Stage 3A Moderate CKD (GFR = 45-59 mL/min/1 73 square meters)    Stage 3B Moderate CKD (GFR = 30-44 mL/min/1 73 square meters)    Stage 4 Severe CKD (GFR = 15-29 mL/min/1 73 square meters)    Stage 5 End Stage CKD (GFR <15 mL/min/1 73 square meters)  Note: GFR calculation is accurate only with a steady state creatinine    Lipase [[id number]]  (Normal) Collected:  02/21/20 0932    Lab Status:  Final result Specimen:  Blood from Arm, Right Updated:  02/21/20 1003     Lipase 159 u/L     Protime-INR [[id number]]  (Normal) Collected:  02/21/20 0932    Lab Status:  Final result Specimen:  Blood from Arm, Right Updated:  02/21/20 0950     Protime 12 2 seconds      INR 0 91    CBC and differential [[id number]]  (Abnormal) Collected:  02/21/20 0932    Lab Status:  Final result Specimen:  Blood from Arm, Right Updated:  02/21/20 0939     WBC 14 40 Thousand/uL      RBC 5 01 Million/uL      Hemoglobin 15 7 g/dL      Hematocrit 46 4 %      MCV 93 fL      MCH 31 3 pg MCHC 33 8 g/dL      RDW 11 8 %      MPV 9 4 fL      Platelets 324 Thousands/uL      nRBC 0 /100 WBCs      Neutrophils Relative 87 %      Immat GRANS % 0 %      Lymphocytes Relative 7 %      Monocytes Relative 6 %      Eosinophils Relative 0 %      Basophils Relative 0 %      Neutrophils Absolute 12 46 Thousands/µL      Immature Grans Absolute 0 06 Thousand/uL      Lymphocytes Absolute 1 01 Thousands/µL      Monocytes Absolute 0 83 Thousand/µL      Eosinophils Absolute 0 01 Thousand/µL      Basophils Absolute 0 03 Thousands/µL                  CT abdomen pelvis with contrast   Final Result by Hannah Miranda MD (02/21 1037)      Findings consistent with sigmoid diverticulitis, with either a large inflamed diverticulum or small debris-containing focal collection (2 3 x 2 x 1 8 cm; images 2/57 and 602/92)  No free intraperitoneal air or drainable focal fluid collection  The study was marked in [facility] for immediate notification  Workstation performed: [id number]                    Procedures  Procedures         ED Course  ED Course as of Feb 22 1925   Fri Feb 21, 2020   1101 CTAP:IMPRESSION:     Findings consistent with sigmoid diverticulitis, with either a large inflamed diverticulum or small debris-containing focal collection (2 3 x 2 x 1 8 cm; images 2/57 and 602/92)  No free intraperitoneal air or drainable focal fluid collection      The study was marked in [facility] for immediate notification      Workstation performed: [id number]               1102 Will start IV Cipro and Flagyl for diverticulitis  1300 [facility] (Dr Yolanda Abebe)  [address] Yolanda Abebe accepts patient to med surg inpatient for acute diverticulitis  1125 Patient updated regarding labs and imaging  She is agreeable to admission          MDM  Number of Diagnoses or Management Options  Diverticulitis:   Left lower quadrant abdominal pain:   Leukocytosis, unspecified type:      Amount and/or Complexity of Data Reviewed  Clinical lab tests: reviewed and ordered  Tests in the radiology section of CPT®: reviewed and ordered  Tests in the medicine section of CPT®: ordered and reviewed  Review and summarize past medical records: yes  Discuss the patient with other providers: yes (Hospitalist, Dr Ratna Larsen)  Independent visualization of images, tracings, or specimens: yes (CT abdomen pelvis)    Risk of Complications, Morbidity, and/or Mortality  Presenting problems: moderate  Diagnostic procedures: moderate  Management options: moderate    Patient Progress  Patient progress: stable        Disposition  Final diagnoses:   Left lower quadrant abdominal pain   Diverticulitis   Leukocytosis, unspecified type     Time reflects when diagnosis was documented in both MDM as applicable and the Disposition within this note     Time User Action Codes Description Comment    2/21/2020 11:31 AM [place] Add [R10 [id number]] Left lower quadrant abdominal pain     2/21/2020 11:31 AM [place] Add [K57 [id number]] Diverticulitis     2/21/2020 11:32 AM [place] Add [D72 [id number]] Leukocytosis, unspecified type     2/22/2020  9:58 AM Alysia Barron Add [K57 [id number]] Sigmoid diverticulitis       ED Disposition     ED Disposition Condition Date/Time Comment    Admit Stable Fri Feb 21, 2020 11:06 AM Case was discussed with Dr Ratna Larsen and the patient's admission status was agreed to be Admission Status: inpatient status to the service of Dr Ratna Larsen   Follow-up Information    None         Current Discharge Medication List      CONTINUE these medications which have NOT CHANGED    Details   oxyCODONE (Roxicodone) 5 mg immediate release tablet Take 10 mg by mouth daily           No discharge procedures on file      PDMP Review     None          ED Provider  Electronically Signed by    MD Deirdre Rivera MD  02/22/20 0808

## 2020-02-21 NOTE — ASSESSMENT & PLAN NOTE
As per patient, her whole spine is affected  Patient has titanium rods in cervical spine- NO MRI  Continue pain management

## 2020-02-22 ENCOUNTER — APPOINTMENT (INPATIENT)
Dept: CT IMAGING | Facility: HOSPITAL | Age: 60
DRG: 392 | End: 2020-02-22
Payer: COMMERCIAL

## 2020-02-22 PROBLEM — I95.0 IDIOPATHIC HYPOTENSION: Status: ACTIVE | Noted: 2020-02-22

## 2020-02-22 LAB
ALBUMIN SERPL BCP-MCNC: 3 G/DL (ref 3.5–5)
ALP SERPL-CCNC: 41 U/L (ref 46–116)
ALT SERPL W P-5'-P-CCNC: 15 U/L (ref 12–78)
ANION GAP SERPL CALCULATED.3IONS-SCNC: 8 MMOL/L (ref 4–13)
AST SERPL W P-5'-P-CCNC: 11 U/L (ref 5–45)
BASOPHILS # BLD MANUAL: 0 THOUSAND/UL (ref 0–0.1)
BASOPHILS NFR MAR MANUAL: 0 % (ref 0–1)
BILIRUB SERPL-MCNC: 0.98 MG/DL (ref 0.2–1)
BUN SERPL-MCNC: 10 MG/DL (ref 5–25)
CALCIUM SERPL-MCNC: 7.8 MG/DL (ref 8.3–10.1)
CHLORIDE SERPL-SCNC: 104 MMOL/L (ref 100–108)
CO2 SERPL-SCNC: 26 MMOL/L (ref 21–32)
CREAT SERPL-MCNC: 0.76 MG/DL (ref 0.6–1.3)
EOSINOPHIL # BLD MANUAL: 0.17 THOUSAND/UL (ref 0–0.4)
EOSINOPHIL NFR BLD MANUAL: 1 % (ref 0–6)
ERYTHROCYTE [DISTWIDTH] IN BLOOD BY AUTOMATED COUNT: 11.9 % (ref 11.6–15.1)
GFR SERPL CREATININE-BSD FRML MDRD: 86 ML/MIN/1.73SQ M
GLUCOSE SERPL-MCNC: 121 MG/DL (ref 65–140)
HCT VFR BLD AUTO: 40.5 % (ref 34.8–46.1)
HGB BLD-MCNC: 13.6 G/DL (ref 11.5–15.4)
LYMPHOCYTES # BLD AUTO: 0.68 THOUSAND/UL (ref 0.6–4.47)
LYMPHOCYTES # BLD AUTO: 4 % (ref 14–44)
MCH RBC QN AUTO: 31.3 PG (ref 26.8–34.3)
MCHC RBC AUTO-ENTMCNC: 33.6 G/DL (ref 31.4–37.4)
MCV RBC AUTO: 93 FL (ref 82–98)
MONOCYTES # BLD AUTO: 0.68 THOUSAND/UL (ref 0–1.22)
MONOCYTES NFR BLD: 4 % (ref 4–12)
NEUTROPHILS # BLD MANUAL: 15.42 THOUSAND/UL (ref 1.85–7.62)
NEUTS BAND NFR BLD MANUAL: 10 % (ref 0–8)
NEUTS SEG NFR BLD AUTO: 81 % (ref 43–75)
NRBC BLD AUTO-RTO: 0 /100 WBCS
PLATELET # BLD AUTO: 229 THOUSANDS/UL (ref 149–390)
PLATELET BLD QL SMEAR: ADEQUATE
PMV BLD AUTO: 9.7 FL (ref 8.9–12.7)
POTASSIUM SERPL-SCNC: 3.2 MMOL/L (ref 3.5–5.3)
PROT SERPL-MCNC: 6.2 G/DL (ref 6.4–8.2)
RBC # BLD AUTO: 4.35 MILLION/UL (ref 3.81–5.12)
RBC MORPH BLD: NORMAL
SODIUM SERPL-SCNC: 138 MMOL/L (ref 136–145)
TOTAL CELLS COUNTED SPEC: 100
WBC # BLD AUTO: 16.94 THOUSAND/UL (ref 4.31–10.16)
WBC TOXIC VACUOLES BLD QL SMEAR: PRESENT

## 2020-02-22 PROCEDURE — 85027 COMPLETE CBC AUTOMATED: CPT | Performed by: FAMILY MEDICINE

## 2020-02-22 PROCEDURE — 85007 BL SMEAR W/DIFF WBC COUNT: CPT | Performed by: FAMILY MEDICINE

## 2020-02-22 PROCEDURE — 80053 COMPREHEN METABOLIC PANEL: CPT | Performed by: FAMILY MEDICINE

## 2020-02-22 PROCEDURE — 74177 CT ABD & PELVIS W/CONTRAST: CPT

## 2020-02-22 PROCEDURE — 99232 SBSQ HOSP IP/OBS MODERATE 35: CPT | Performed by: FAMILY MEDICINE

## 2020-02-22 PROCEDURE — C9113 INJ PANTOPRAZOLE SODIUM, VIA: HCPCS | Performed by: FAMILY MEDICINE

## 2020-02-22 RX ORDER — SIMETHICONE 80 MG
80 TABLET,CHEWABLE ORAL ONCE
Status: COMPLETED | OUTPATIENT
Start: 2020-02-22 | End: 2020-02-22

## 2020-02-22 RX ORDER — POTASSIUM CHLORIDE 29.8 MG/ML
40 INJECTION INTRAVENOUS ONCE
Status: COMPLETED | OUTPATIENT
Start: 2020-02-22 | End: 2020-02-22

## 2020-02-22 RX ORDER — HYDROMORPHONE HCL 110MG/55ML
1 PATIENT CONTROLLED ANALGESIA SYRINGE INTRAVENOUS EVERY 4 HOURS PRN
Status: DISCONTINUED | OUTPATIENT
Start: 2020-02-22 | End: 2020-02-27 | Stop reason: HOSPADM

## 2020-02-22 RX ADMIN — HYDROMORPHONE HYDROCHLORIDE 1 MG: 2 INJECTION INTRAMUSCULAR; INTRAVENOUS; SUBCUTANEOUS at 21:44

## 2020-02-22 RX ADMIN — HYDROMORPHONE HYDROCHLORIDE 1 MG: 2 INJECTION INTRAMUSCULAR; INTRAVENOUS; SUBCUTANEOUS at 17:01

## 2020-02-22 RX ADMIN — PIPERACILLIN AND TAZOBACTAM 3.38 G: 3; .375 INJECTION, POWDER, LYOPHILIZED, FOR SOLUTION INTRAVENOUS at 05:34

## 2020-02-22 RX ADMIN — METRONIDAZOLE 500 MG: 500 INJECTION, SOLUTION INTRAVENOUS at 03:38

## 2020-02-22 RX ADMIN — PIPERACILLIN AND TAZOBACTAM 3.38 G: 3; .375 INJECTION, POWDER, LYOPHILIZED, FOR SOLUTION INTRAVENOUS at 23:14

## 2020-02-22 RX ADMIN — SIMETHICONE CHEW TAB 80 MG 80 MG: 80 TABLET ORAL at 03:38

## 2020-02-22 RX ADMIN — OXYCODONE HYDROCHLORIDE 10 MG: 10 TABLET ORAL at 08:06

## 2020-02-22 RX ADMIN — PIPERACILLIN AND TAZOBACTAM 3.38 G: 3; .375 INJECTION, POWDER, LYOPHILIZED, FOR SOLUTION INTRAVENOUS at 17:02

## 2020-02-22 RX ADMIN — METRONIDAZOLE 500 MG: 500 INJECTION, SOLUTION INTRAVENOUS at 11:33

## 2020-02-22 RX ADMIN — POTASSIUM CHLORIDE 40 MEQ: 29.8 INJECTION, SOLUTION INTRAVENOUS at 10:08

## 2020-02-22 RX ADMIN — METRONIDAZOLE 500 MG: 500 INJECTION, SOLUTION INTRAVENOUS at 20:38

## 2020-02-22 RX ADMIN — SODIUM CHLORIDE 500 ML: 0.9 INJECTION, SOLUTION INTRAVENOUS at 10:08

## 2020-02-22 RX ADMIN — SODIUM CHLORIDE 125 ML/HR: 0.9 INJECTION, SOLUTION INTRAVENOUS at 21:47

## 2020-02-22 RX ADMIN — MORPHINE SULFATE 4 MG: 4 INJECTION INTRAVENOUS at 04:23

## 2020-02-22 RX ADMIN — PIPERACILLIN AND TAZOBACTAM 3.38 G: 3; .375 INJECTION, POWDER, LYOPHILIZED, FOR SOLUTION INTRAVENOUS at 10:48

## 2020-02-22 RX ADMIN — SODIUM CHLORIDE 125 ML/HR: 0.9 INJECTION, SOLUTION INTRAVENOUS at 09:56

## 2020-02-22 RX ADMIN — SODIUM CHLORIDE 125 ML/HR: 0.9 INJECTION, SOLUTION INTRAVENOUS at 13:01

## 2020-02-22 RX ADMIN — PANTOPRAZOLE SODIUM 40 MG: 40 INJECTION, POWDER, FOR SOLUTION INTRAVENOUS at 08:06

## 2020-02-22 RX ADMIN — ENOXAPARIN SODIUM 40 MG: 40 INJECTION SUBCUTANEOUS at 08:06

## 2020-02-22 NOTE — UTILIZATION REVIEW
Initial Clinical Review    Admission: Date/Time/Statement: Admission Orders (From admission, onward)     Ordered        02/21/20 1131  Inpatient Admission (expected length of stay for this patient Order details is greater than two midnights)  Once                   Orders Placed This Encounter   Procedures    Inpatient Admission (expected length of stay for this patient Order details is greater than two midnights)     Standing Status:   Standing     Number of Occurrences:   1     Order Specific Question:   Admitting Physician     Answer:   Pinky Valerio [[id number]]     Order Specific Question:   Level of Care     Answer:   Med Surg [16]     Order Specific Question:   Estimated length of stay     Answer:   More than 2 Midnights     Order Specific Question:   Certification     Answer:   I certify that inpatient services are medically necessary for this patient for a duration of greater than two midnights  See H&P and MD Progress Notes for additional information about the patient's course of treatment  ED Arrival Information     Expected Arrival Acuity Means of Arrival Escorted By Service Admission Type    - 2/21/2020 09:12 Urgent Amb[facility] [address] [address] Hospitalist Urgent    Arrival Complaint    Abd Pain        Chief Complaint   Patient presents with    Abdominal Pain     pt reports LLQ abdominal pain since 1am this morning  last BM this morning, pt denies blood in stool  pt reports HX of diverticulitis     Assessment/Plan:     62 Y O female  Presents to ED  From home with abdominal pain, left lower  Quadrant, non radiating,  Since the  Am of admission  + nausea  PMH  Is  Spinal stenosis, post laminectomy  Syndrome  Ct abd  Shows  Diverticulitis  Labs   Revealed  Elevated  WBC, bands  Admit  Ip with  Sigmoid diverticulitis, nausea, bandemia and plan is   Monitor labs,  IVF, ADRIA  And pain control        Per  Surgery consult:   Findings  Reveal   Acute sigmoid colon diverticulitis no evidence of free perforation or free air  Possible small pericolic abscess versus large diverticula  Cont  IVF, ADRIA  And sips  Only  Cl liq  Need  Serial labs  And  Abdominal exams          ED Triage Vitals [02/21/20 0918]   Temperature Pulse Respirations Blood Pressure SpO2   98 2 °F (36 8 °C) 72 16 136/65 97 %      Temp Source Heart Rate Source Patient Position - Orthostatic VS BP Location FiO2 (%)   Temporal Monitor Lying -- --      Pain Score       Worst Possible Pain        Wt Readings from Last 1 Encounters:   02/21/20 74 6 kg (164 lb 7 4 oz)     Additional Vital Signs:   Date/Time  Temp  Pulse  Resp  BP  MAP (mmHg)  SpO2  O2 Device  Patient Position - Orthostatic VS   02/22/20 14:38:23  100 9 °F (38 3 °C)Abnormal   78    114/55  75  92 %       02/22/20 09:54:19  98 9 °F (37 2 °C)  80    99/50  66  93 %       02/22/20 07:36:18  98 4 °F (36 9 °C)  73  18  96/48Abnormal   64  90 %       02/21/20 23:10:36  98 5 °F (36 9 °C)  81  17  106/49Abnormal   68  91 %       02/21/20 2057  100 4 °F (38 °C)                 02/21/20 19:58:28  101 8 °F (38 8 °C)Abnormal   87  17  107/49Abnormal   68  93 %       02/21/20 12:43:09  102 °F (38 9 °C)Abnormal   81  20  122/63  83  88 %Abnormal        02/21/20 1145    81  16      97 %  None (Room air)     02/21/20 1130    85    135/63  91  97 %       02/21/20 1115    78        92 %       02/21/20 1100    77    123/61  88  93 %  None (Room air)     02/21/20 1045    80    130/60  86  92 %       02/21/20 1030    88        94 %       02/21/20 1015    81    123/75  94  93 %  None (Room air)     02/21/20 0945    59  16  131/61  88  99 %       02/21/20 0918  98 2 °F (36 8 °C)  72  16  136/65    97 %  None (Room air)  Lying         Pertinent Labs/Diagnostic Test Results:   CT  Abd/pelvis  ( 2/21)    Findings consistent with sigmoid diverticulitis, with either a large inflamed diverticulum or small debris-containing focal collection (2 3 x 2 x 1 8 cm; images 2/57 and 602/92)   No free intraperitoneal air or drainable focal fluid collection  Results from last 7 days   Lab Units 02/22/20  0448 02/21/20  0932   WBC Thousand/uL 16 94* 14 40*   HEMOGLOBIN g/dL 13 6 15 7*   HEMATOCRIT % 40 5 46 4*   PLATELETS Thousands/uL 229 285   NEUTROS ABS Thousands/µL  --  12 46*   BANDS PCT % 10*  --          Results from last 7 days   Lab Units 02/22/20  0448 02/21/20  0932   SODIUM mmol/L 138 141   POTASSIUM mmol/L 3 2* 4 0   CHLORIDE mmol/L 104 103   CO2 mmol/L 26 30   ANION GAP mmol/L 8 8   BUN mg/dL 10 14   CREATININE mg/dL 0 76 0 78   EGFR ml/min/1 73sq m 86 83   CALCIUM mg/dL 7 8* 8 7     Results from last 7 days   Lab Units 02/22/20  0448 02/21/20  0932   AST U/L 11 13   ALT U/L 15 20   ALK PHOS U/L 41* 53   TOTAL PROTEIN g/dL 6 2* 7 5   ALBUMIN g/dL 3 0* 4 0   TOTAL BILIRUBIN mg/dL 0 98 0 36         Results from last 7 days   Lab Units 02/22/20  0448 02/21/20  0932   GLUCOSE RANDOM mg/dL 121 107               Results from last 7 days   Lab Units 02/21/20  0932   PROTIME seconds 12 2   INR  0 91             Results from last 7 days   Lab Units 02/21/20  0932   LACTIC ACID mmol/L 1 9               Results from last 7 days   Lab Units 02/21/20  1356 02/21/20  0932   LIPASE u/L  --  159   AMYLASE IU/L 36  --              Results from last 7 days   Lab Units 02/21/20  1039   CLARITY UA  Clear   COLOR UA  Yellow   SPEC GRAV UA  1 010   PH UA  6 0   GLUCOSE UA mg/dl Negative   KETONES UA mg/dl Negative   BLOOD UA  Trace-lysed*   PROTEIN UA mg/dl Negative   NITRITE UA  Negative   BILIRUBIN UA  Negative   UROBILINOGEN UA E U /dl 0 2   LEUKOCYTES UA  Negative   WBC UA /hpf None Seen   RBC UA /hpf None Seen   BACTERIA UA /hpf None Seen   EPITHELIAL CELLS WET PREP /hpf None Seen               Results from last 7 days   Lab Units 02/22/20  0448   TOTAL COUNTED  100           ED Treatment:   Medication Administration from 02/21/2020 0912 to 02/21/2020 1232       Date/Time Order Dose Route Action Comments     02/21/2020 1040 sodium chloride 0 9 % bolus 1,000 mL 0 mL Intravenous Stopped      02/21/2020 0937 sodium chloride 0 9 % bolus 1,000 mL 1,000 mL Intravenous New Bag      02/21/2020 0938 morphine (PF) 4 mg/mL injection 4 mg 4 mg Intravenous Given      02/21/2020 0938 ondansetron (ZOFRAN) injection 4 mg 4 mg Intravenous Given      02/21/2020 1009 iohexol (OMNIPAQUE) 350 MG/ML injection (MULTI-DOSE) 100 mL 100 mL Intravenous Given      02/21/2020 1113 ciprofloxacin (CIPRO) IVPB (premix) 400 mg 400 mg Intravenous New Bag      02/21/2020 1112 sodium chloride 0 9 % bolus 1,000 mL 1,000 mL Intravenous New Bag         Present on Admission:   Sigmoid diverticulitis   Bandemia   Spinal stenosis   Nausea      Admitting Diagnosis: Diverticulitis [K57 92]  Abdominal pain [R10 9]  Leukocytosis, unspecified type [D72 829]  Left lower quadrant abdominal pain [R10 32]  Age/Sex: 62 y o  female  Admission Orders:  Scheduled Medications:    Medications:  enoxaparin 40 mg Subcutaneous Daily   piperacillin-tazobactam 3 375 g Intravenous Q6H   And      metroNIDAZOLE 500 mg Intravenous Q8H   oxyCODONE 10 mg Oral Daily   pantoprazole 40 mg Intravenous Q24H [facility]     Continuous IV Infusions:    sodium chloride 125 mL/hr Intravenous Continuous     PRN Meds:    acetaminophen 650 mg Oral Q6H PRN   ondansetron 4 mg Intravenous Once PRN   ondansetron 4 mg Intravenous Q6H PRN     IV  MSO4   X1  2/22  IP CONSULT TO ACUTE CARE SURGERY    Network Utilization Review Department  [email or web address]  ATTENTION: Please call with any questions or concerns to [phone] and carefully listen to the prompts so that you are directed to the right person  All voicemails are confidential   Kenneth Boateng all requests for admission clinical reviews, approved or denied determinations and any other requests to dedicated fax number below belonging to the campus where the patient is receiving treatment   List of dedicated fax numbers for the Facilities:  FACILITY NAME UR FAX NUMBER   ADMISSION DENIALS (Administrative/Medical Necessity) [address] [facility] (Maternity/NICU/Pediatrics) [phone]   [facility] [phone]   [facility] [phone]   [facility] [phone]   [facility] [phone]   [address] [place] [address] [phone]   [facility]  [phone]   [address] [facility] [address] [facility]  [address] [facility] [address] [facility] [phone]

## 2020-02-22 NOTE — PLAN OF CARE
Problem: Nutrition/Hydration-ADULT  Goal: Nutrient/Hydration intake appropriate for improving, restoring or maintaining nutritional needs  Description  Monitor and assess patient's nutrition/hydration status for malnutrition  Collaborate with interdisciplinary team and initiate plan and interventions as ordered  Monitor patient's weight and dietary intake as ordered or per policy  Utilize nutrition screening tool and intervene as necessary  Determine patient's food preferences and provide high-protein, high-caloric foods as appropriate       INTERVENTIONS:  - Monitor oral intake, urinary output, labs, and treatment plans  - Assess nutrition and hydration status and recommend course of action  - Evaluate amount of meals eaten  - Assist patient with eating if necessary   - Allow adequate time for meals  - Recommend/ encourage appropriate diets, oral nutritional supplements, and vitamin/mineral supplements  - Order, calculate, and assess calorie counts as needed  - Recommend, monitor, and adjust tube feedings and TPN/PPN based on assessed needs  - Assess need for intravenous fluids  - Provide specific nutrition/hydration education as appropriate  - Include patient/family/caregiver in decisions related to nutrition  Outcome: Progressing     Problem: PAIN - ADULT  Goal: Verbalizes/displays adequate comfort level or baseline comfort level  Description  Interventions:  - Encourage patient to monitor pain and request assistance  - Assess pain using appropriate pain scale  - Administer analgesics based on type and severity of pain and evaluate response  - Implement non-pharmacological measures as appropriate and evaluate response  - Consider cultural and social influences on pain and pain management  - Notify physician/advanced practitioner if interventions unsuccessful or patient reports new pain  Outcome: Progressing     Problem: INFECTION - ADULT  Goal: Absence or prevention of progression during hospitalization  Description  INTERVENTIONS:  - Assess and monitor for signs and symptoms of infection  - Monitor lab/diagnostic results  - Monitor all insertion sites, i e  indwelling lines, tubes, and drains  - Monitor endotracheal if appropriate and nasal secretions for changes in amount and color  - [place] appropriate cooling/warming therapies per order  - Administer medications as ordered  - Instruct and encourage patient and family to use good hand hygiene technique  - Identify and instruct in appropriate isolation precautions for identified infection/condition  Outcome: Progressing  Goal: Absence of fever/infection during neutropenic period  Description  INTERVENTIONS:  - Monitor WBC    Outcome: Progressing     Problem: SAFETY ADULT  Goal: Patient will remain free of falls  Description  INTERVENTIONS:  - Assess patient frequently for physical needs  -  Identify cognitive and physical deficits and behaviors that affect risk of falls    -  [place] fall precautions as indicated by assessment   - Educate patient/family on patient safety including physical limitations  - Instruct patient to call for assistance with activity based on assessment  - Modify environment to reduce risk of injury  - Consider OT/PT consult to assist with strengthening/mobility  Outcome: Progressing  Goal: Maintain or return to baseline ADL function  Description  INTERVENTIONS:  -  Assess patient's ability to carry out ADLs; assess patient's baseline for ADL function and identify physical deficits which impact ability to perform ADLs (bathing, care of mouth/teeth, toileting, grooming, dressing, etc )  - Assess/evaluate cause of self-care deficits   - Assess range of motion  - Assess patient's mobility; develop plan if impaired  - Assess patient's need for assistive devices and provide as appropriate  - Encourage maximum independence but intervene and supervise when necessary  - Involve family in performance of ADLs  - Assess for home care needs following discharge   - Consider OT consult to assist with ADL evaluation and planning for discharge  - Provide patient education as appropriate  Outcome: Progressing  Goal: Maintain or return mobility status to optimal level  Description  INTERVENTIONS:  - Assess patient's baseline mobility status (ambulation, transfers, stairs, etc )    - Identify cognitive and physical deficits and behaviors that affect mobility  - Identify mobility aids required to assist with transfers and/or ambulation (gait belt, sit-to-stand, lift, walker, cane, etc )  - [place] fall precautions as indicated by assessment  - Record patient progress and toleration of activity level on Mobility SBAR; progress patient to next Phase/Stage  - Instruct patient to call for assistance with activity based on assessment  - Consider rehabilitation consult to assist with strengthening/weightbearing, etc   Outcome: Progressing     Problem: DISCHARGE PLANNING  Goal: Discharge to home or other facility with appropriate resources  Description  INTERVENTIONS:  - Identify barriers to discharge w/patient and caregiver  - Arrange for needed discharge resources and transportation as appropriate  - Identify discharge learning needs (meds, wound care, etc )  - Arrange for interpretive services to assist at discharge as needed  - Refer to Case Management Department for coordinating discharge planning if the patient needs post-hospital services based on physician/advanced practitioner order or complex needs related to functional status, cognitive ability, or social support system  Outcome: Progressing     Problem: Prexisting or High Potential for Compromised Skin Integrity  Goal: Skin integrity is maintained or improved  Description  INTERVENTIONS:  - Identify patients at risk for skin breakdown  - Assess and monitor skin integrity  - Assess and monitor nutrition and hydration status  - Monitor labs   - Assess for incontinence   - Turn and reposition patient  - Assist with mobility/ambulation  - Relieve pressure over bony prominences  - Avoid friction and shearing  - Provide appropriate hygiene as needed including keeping skin clean and dry  - Evaluate need for skin moisturizer/barrier cream  - Collaborate with interdisciplinary team   - Patient/family teaching  - Consider wound care consult   Outcome: Progressing     Problem: Potential for Falls  Goal: Patient will remain free of falls  Description  INTERVENTIONS:  - Assess patient frequently for physical needs  -  Identify cognitive and physical deficits and behaviors that affect risk of falls    -  [place] fall precautions as indicated by assessment   - Educate patient/family on patient safety including physical limitations  - Instruct patient to call for assistance with activity based on assessment  - Modify environment to reduce risk of injury  - Consider OT/PT consult to assist with strengthening/mobility  Outcome: Progressing

## 2020-02-22 NOTE — ASSESSMENT & PLAN NOTE
Patient is still having left lower quadrant pain  But no fever, no nausea, vomiting, diarrhea  General surgery consult appreciated  Elevation of WBC  Continued Zosyn and metronidazole  Serial abdominal exam- if deteriorate, we will repeat CT scan of abdomen

## 2020-02-22 NOTE — CONSULTS
Consultation - General Surgery   Gonzalse Acosta 62 y o  female MRN: [id number]  Unit/Bed#: -01 Encounter: [id number]    Assessment/Plan     Assessment:  Acute sigmoid colon diverticulitis no evidence of free perforation or free air  Possible small pericolic abscess versus large diverticula  Plan:  Continue with only sips of clear liquids and IV fluids IV antibiotics  I recommend serial labs and serial abdominal exams  History of Present Illness     HPI:  Gonzales Acosta is a 62 y o  female who presents with chief complaint 24-48 hours lower abdominal pain mostly in the left lower abdomen extending to the suprapubic area  She admits to nausea but no vomiting  She states her bowel movements are usually hard  She does have a history of diverticulosis noted on  colonoscopy from 2016     The patient denies any diarrhea  She denies any fevers  She denies any history of such pain in the past     Consults    Review of Systems   Constitutional: Negative  HENT: Negative  Eyes: Negative  Respiratory: Negative  Cardiovascular: Negative  Gastrointestinal: Positive for abdominal pain and nausea  Endocrine: Negative  Genitourinary: Negative  Musculoskeletal: Negative  Neurological: Negative  Hematological: Negative          Historical Information   Past Medical History:   Diagnosis Date    Diverticulitis     Spinal stenosis      Past Surgical History:   Procedure Laterality Date    CARPAL TUNNEL RELEASE      CERVICAL FUSION       SECTION      HYSTERECTOMY      NECK SURGERY      TONSILLECTOMY       Social History   Social History     Substance and Sexual Activity   Alcohol Use Not Currently     Social History     Substance and Sexual Activity   Drug Use Not Currently     Social History     Tobacco Use   Smoking Status Current Every Day Smoker    Packs/day: 0 50    Years: 0 00    Pack years: 0 00   Smokeless Tobacco Never Used     Family History: non-contributory    Meds/Allergies   all current active meds have been reviewed  Allergies   Allergen Reactions    Ibuprofen      Nausea, vomiting  gi symtoms      Nsaids      nausea    Pollen Extract      congestion       Objective   First Vitals:   Blood Pressure: 136/65 (02/21/20 0918)  Pulse: 72 (02/21/20 0918)  Temperature: 98 2 °F (36 8 °C) (02/21/20 0918)  Temp Source: Temporal (02/21/20 0918)  Respirations: 16 (02/21/20 0918)  Height: 5' 6" (167 6 cm) (02/21/20 9577)  Weight - Scale: 67 3 kg (148 lb 5 9 oz) (02/21/20 0918)  SpO2: 97 % (02/21/20 0918)    Current Vitals:   Blood Pressure: 99/50 (02/22/20 0954)  Pulse: 80 (02/22/20 0954)  Temperature: 98 9 °F (37 2 °C) (02/22/20 0954)  Temp Source: Oral (02/21/20 2057)  Respirations: 18 (02/22/20 0736)  Height: 5' 6" (167 6 cm) (02/21/20 1243)  Weight - Scale: 74 6 kg (164 lb 7 4 oz) (02/21/20 1243)  SpO2: 93 % (02/22/20 0954)      Intake/Output Summary (Last 24 hours) at 2/22/2020 1058  Last data filed at 2/22/2020 1008  Gross per 24 hour   Intake 4312 08 ml   Output    Net 4312 08 ml       Invasive Devices     Peripheral Intravenous Line            Peripheral IV 02/21/20 Right Antecubital 1 day                Physical Exam   Constitutional: She appears well-developed and well-nourished  Eyes: Pupils are equal, round, and reactive to light  EOM are normal    Neck: Normal range of motion  Neck supple  Cardiovascular: Normal rate  Pulmonary/Chest: Effort normal    Abdominal: Soft  There is tenderness in the suprapubic area and left lower quadrant  No masses noted  Lab Results:   I have personally reviewed pertinent lab results    , CBC:   Lab Results   Component Value Date    WBC 16 94 (H) 02/22/2020    HGB 13 6 02/22/2020    HCT 40 5 02/22/2020    MCV 93 02/22/2020     02/22/2020    MCH 31 3 02/22/2020    MCHC 33 6 02/22/2020    RDW 11 9 02/22/2020    MPV 9 7 02/22/2020    NRBC 0 02/22/2020   , CMP:   Lab Results   Component Value Date SODIUM 138 02/22/2020    K 3 2 (L) 02/22/2020     02/22/2020    CO2 26 02/22/2020    BUN 10 02/22/2020    CREATININE 0 76 02/22/2020    CALCIUM 7 8 (L) 02/22/2020    AST 11 02/22/2020    ALT 15 02/22/2020    ALKPHOS 41 (L) 02/22/2020    EGFR 86 02/22/2020     Imaging: I have personally reviewed pertinent reports  EKG, Pathology, and Other Studies: I have personally reviewed pertinent reports  and I have personally reviewed pertinent films in PACS    Counseling / Coordination of Care  Total floor / unit time spent today 35  minutes  Greater than 50% of total time was spent with the patient and / or family counseling and / or coordination of care  A description of the counseling / coordination of care: as noted in plan, conservative therapy  Dar Steele

## 2020-02-22 NOTE — PROGRESS NOTES
Progress Note - Jose F Cordova 1960, 62 y o  female MRN: [id number]    Unit/Bed#: -01 Encounter: [id number]    Primary Care Provider: Rony Beth MD   Date and time admitted to hospital: 2/21/2020  9:12 AM        * Sigmoid diverticulitis  Assessment & Plan  Patient is still having left lower quadrant pain  But no fever, no nausea, vomiting, diarrhea  General surgery consult appreciated  Elevation of WBC  Continued Zosyn and metronidazole  Serial abdominal exam- if deteriorate, we will repeat CT scan of abdomen    Idiopathic hypotension  Assessment & Plan  Unknown etiology  Patient is on fluid therapy due to sigmoid diverticulitis  Also received 500 cc of bolus  Monitor vital  As per patient, her blood pressure remains 90/60  Patient is asymptomatic    Nausea  Assessment & Plan  Improving  Continue clear diet    Bandemia  Assessment & Plan  Continue to trend up  Keep monitor  Most likely secondary to sigmoid diverticulitis    Spinal stenosis  Assessment & Plan  As per patient, her whole spine is affected  Patient has titanium rods in cervical spine- NO MRI  Continue pain management    Post laminectomy syndrome  Assessment & Plan  Continue conservative management  Continue pain medication for pain management        VTE Pharmacologic Prophylaxis:   Pharmacologic: Enoxaparin (Lovenox)  Mechanical VTE Prophylaxis in Place: Yes    Patient Centered Rounds: I have performed bedside rounds with nursing staff today  Discussions with Specialists or Other Care Team Provider:  General surgery    Education and Discussions with Family / Patient:  None    Time Spent for Care: 30 minutes  More than 50% of total time spent on counseling and coordination of care as described above      Current Length of Stay: 1 day(s)    Current Patient Status: Inpatient   Certification Statement: The patient will continue to require additional inpatient hospital stay due to Sigmoid diverticulitis, bandemia, hypotension, spinal stenosis, post laminectomy syndrome    Discharge Plan / Estimated Discharge Date: To be determined      Code Status: Level 1 - Full Code      Subjective:   Seen and evaluated during the round  Feels mildly nauseated  But denies any fever, any vomiting, any diarrhea, any blood, any problem with urination  Also reports she feels pain in left lower quadrant whenever she coughs or put pressure  Objective:     Vitals:   Temp (24hrs), Av 6 °F (37 6 °C), Min:98 4 °F (36 9 °C), Max:101 8 °F (38 8 °C)    Temp:  [98 4 °F (36 9 °C)-101 8 °F (38 8 °C)] 98 9 °F (37 2 °C)  HR:  [73-87] 80  Resp:  [17-18] 18  BP: ()/(48-50) 99/50  SpO2:  [90 %-93 %] 93 %  Body mass index is 26 55 kg/m²  Input and Output Summary (last 24 hours): Intake/Output Summary (Last 24 hours) at 2020 1245  Last data filed at 2020 1008  Gross per 24 hour   Intake 4312 08 ml   Output    Net 4312 08 ml       Physical Exam:     Physical Exam   Constitutional: She is oriented to person, place, and time  She appears well-developed  No distress  HENT:   Head: Normocephalic and atraumatic  Mouth/Throat: Oropharynx is clear and moist  No oropharyngeal exudate  Eyes: Pupils are equal, round, and reactive to light  Conjunctivae and EOM are normal  No scleral icterus  Neck: Normal range of motion  Neck supple  No JVD present  Cardiovascular: Normal rate  Exam reveals no gallop and no friction rub  No murmur heard  Pulmonary/Chest: Effort normal and breath sounds normal  No stridor  No respiratory distress  She has no wheezes  Abdominal: Soft  Bowel sounds are normal  She exhibits no distension and no mass  There is tenderness (left lower quadrant)  There is rebound and guarding  No hernia  Musculoskeletal: Normal range of motion  She exhibits no edema  Neurological: She is alert and oriented to person, place, and time  She displays normal reflexes  No cranial nerve deficit  She exhibits normal muscle tone  Coordination normal    Skin: Skin is warm  Capillary refill takes less than 2 seconds  Psychiatric: She has a normal mood and affect  Nursing note and vitals reviewed  Additional Data:     Labs:    Results from last 7 days   Lab Units 02/22/20  0448 02/21/20  0932   WBC Thousand/uL 16 94* 14 40*   HEMOGLOBIN g/dL 13 6 15 7*   HEMATOCRIT % 40 5 46 4*   PLATELETS Thousands/uL 229 285   NEUTROS PCT %  --  87*   LYMPHS PCT %  --  7*   LYMPHO PCT % 4*  --    MONOS PCT %  --  6   MONO PCT % 4  --    EOS PCT % 1 0     Results from last 7 days   Lab Units 02/22/20  0448   POTASSIUM mmol/L 3 2*   CHLORIDE mmol/L 104   CO2 mmol/L 26   BUN mg/dL 10   CREATININE mg/dL 0 76   CALCIUM mg/dL 7 8*   ALK PHOS U/L 41*   ALT U/L 15   AST U/L 11     Results from last 7 days   Lab Units 02/21/20  0932   INR  0 91       * I Have Reviewed All Lab Data Listed Above  * Additional Pertinent Lab Tests Reviewed:  All Labs Within Last 24 Hours Reviewed      Last 24 Hours Medication List:     Current Facility-Administered Medications:  acetaminophen 650 mg Oral Q6H PRN [place] Raphael Wise MD    enoxaparin 40 mg Subcutaneous Daily Alysia Wise MD    piperacillin-tazobactam 3 375 g Intravenous Q6H [place] Raphael Wise MD Last Rate: 3 375 g (02/22/20 1048)   And        metroNIDAZOLE 500 mg Intravenous Q8H Alysia Wise MD Last Rate: 500 mg (02/22/20 1133)   ondansetron 4 mg Intravenous Once PRN Nikki Abarca MD    ondansetron 4 mg Intravenous Q6H PRN Pranay Shook MD    oxyCODONE 10 mg Oral Daily Alysia Wise MD    pantoprazole 40 mg Intravenous Q24H [address] Alysia Wise MD    potassium chloride 40 mEq Intravenous Once Pranay Shook MD Last Rate: 40 mEq (02/22/20 1008)   sodium chloride 125 mL/hr Intravenous Continuous Pranay Shook MD Last Rate: 125 mL/hr (02/22/20 0956)        Today, Patient Was Seen By: Pranay Shook MD    ** Please Note: Dragon 360 Dictation voice to text software may have been used in the creation of this document   **

## 2020-02-23 PROBLEM — E87.6 HYPOKALEMIA: Status: ACTIVE | Noted: 2020-02-23

## 2020-02-23 PROBLEM — K80.20 CALCULUS OF GALLBLADDER WITHOUT CHOLECYSTITIS WITHOUT OBSTRUCTION: Status: ACTIVE | Noted: 2020-02-23

## 2020-02-23 PROBLEM — E83.51 HYPOCALCEMIA: Status: ACTIVE | Noted: 2020-02-23

## 2020-02-23 LAB
ANION GAP SERPL CALCULATED.3IONS-SCNC: 8 MMOL/L (ref 4–13)
BASOPHILS # BLD AUTO: 0.02 THOUSANDS/ΜL (ref 0–0.1)
BASOPHILS NFR BLD AUTO: 0 % (ref 0–1)
BUN SERPL-MCNC: 8 MG/DL (ref 5–25)
CALCIUM SERPL-MCNC: 7.9 MG/DL (ref 8.3–10.1)
CHLORIDE SERPL-SCNC: 105 MMOL/L (ref 100–108)
CO2 SERPL-SCNC: 26 MMOL/L (ref 21–32)
CREAT SERPL-MCNC: 0.61 MG/DL (ref 0.6–1.3)
EOSINOPHIL # BLD AUTO: 0 THOUSAND/ΜL (ref 0–0.61)
EOSINOPHIL NFR BLD AUTO: 0 % (ref 0–6)
ERYTHROCYTE [DISTWIDTH] IN BLOOD BY AUTOMATED COUNT: 11.9 % (ref 11.6–15.1)
GFR SERPL CREATININE-BSD FRML MDRD: 100 ML/MIN/1.73SQ M
GLUCOSE SERPL-MCNC: 118 MG/DL (ref 65–140)
HCT VFR BLD AUTO: 37.6 % (ref 34.8–46.1)
HGB BLD-MCNC: 12.4 G/DL (ref 11.5–15.4)
IMM GRANULOCYTES # BLD AUTO: 0.1 THOUSAND/UL (ref 0–0.2)
IMM GRANULOCYTES NFR BLD AUTO: 1 % (ref 0–2)
LYMPHOCYTES # BLD AUTO: 0.78 THOUSANDS/ΜL (ref 0.6–4.47)
LYMPHOCYTES NFR BLD AUTO: 5 % (ref 14–44)
MCH RBC QN AUTO: 31.1 PG (ref 26.8–34.3)
MCHC RBC AUTO-ENTMCNC: 33 G/DL (ref 31.4–37.4)
MCV RBC AUTO: 94 FL (ref 82–98)
MONOCYTES # BLD AUTO: 0.58 THOUSAND/ΜL (ref 0.17–1.22)
MONOCYTES NFR BLD AUTO: 4 % (ref 4–12)
NEUTROPHILS # BLD AUTO: 13.69 THOUSANDS/ΜL (ref 1.85–7.62)
NEUTS SEG NFR BLD AUTO: 90 % (ref 43–75)
NRBC BLD AUTO-RTO: 0 /100 WBCS
PLATELET # BLD AUTO: 211 THOUSANDS/UL (ref 149–390)
PMV BLD AUTO: 10.5 FL (ref 8.9–12.7)
POTASSIUM SERPL-SCNC: 3.2 MMOL/L (ref 3.5–5.3)
RBC # BLD AUTO: 3.99 MILLION/UL (ref 3.81–5.12)
SODIUM SERPL-SCNC: 139 MMOL/L (ref 136–145)
WBC # BLD AUTO: 15.17 THOUSAND/UL (ref 4.31–10.16)

## 2020-02-23 PROCEDURE — 85025 COMPLETE CBC W/AUTO DIFF WBC: CPT | Performed by: SURGERY

## 2020-02-23 PROCEDURE — 99233 SBSQ HOSP IP/OBS HIGH 50: CPT | Performed by: FAMILY MEDICINE

## 2020-02-23 PROCEDURE — C9113 INJ PANTOPRAZOLE SODIUM, VIA: HCPCS | Performed by: FAMILY MEDICINE

## 2020-02-23 PROCEDURE — 80048 BASIC METABOLIC PNL TOTAL CA: CPT | Performed by: FAMILY MEDICINE

## 2020-02-23 RX ORDER — POTASSIUM CHLORIDE 29.8 MG/ML
40 INJECTION INTRAVENOUS ONCE
Status: COMPLETED | OUTPATIENT
Start: 2020-02-23 | End: 2020-02-23

## 2020-02-23 RX ADMIN — METRONIDAZOLE 500 MG: 500 INJECTION, SOLUTION INTRAVENOUS at 03:31

## 2020-02-23 RX ADMIN — SODIUM CHLORIDE 125 ML/HR: 0.9 INJECTION, SOLUTION INTRAVENOUS at 08:31

## 2020-02-23 RX ADMIN — METRONIDAZOLE 500 MG: 500 INJECTION, SOLUTION INTRAVENOUS at 19:14

## 2020-02-23 RX ADMIN — HYDROMORPHONE HYDROCHLORIDE 1 MG: 2 INJECTION INTRAMUSCULAR; INTRAVENOUS; SUBCUTANEOUS at 14:03

## 2020-02-23 RX ADMIN — PIPERACILLIN AND TAZOBACTAM 3.38 G: 3; .375 INJECTION, POWDER, LYOPHILIZED, FOR SOLUTION INTRAVENOUS at 17:23

## 2020-02-23 RX ADMIN — HYDROMORPHONE HYDROCHLORIDE 1 MG: 2 INJECTION INTRAMUSCULAR; INTRAVENOUS; SUBCUTANEOUS at 19:22

## 2020-02-23 RX ADMIN — PANTOPRAZOLE SODIUM 40 MG: 40 INJECTION, POWDER, FOR SOLUTION INTRAVENOUS at 08:09

## 2020-02-23 RX ADMIN — HYDROMORPHONE HYDROCHLORIDE 1 MG: 2 INJECTION INTRAMUSCULAR; INTRAVENOUS; SUBCUTANEOUS at 04:11

## 2020-02-23 RX ADMIN — HYDROMORPHONE HYDROCHLORIDE 1 MG: 2 INJECTION INTRAMUSCULAR; INTRAVENOUS; SUBCUTANEOUS at 23:55

## 2020-02-23 RX ADMIN — PIPERACILLIN AND TAZOBACTAM 3.38 G: 3; .375 INJECTION, POWDER, LYOPHILIZED, FOR SOLUTION INTRAVENOUS at 05:21

## 2020-02-23 RX ADMIN — OXYCODONE HYDROCHLORIDE 10 MG: 10 TABLET ORAL at 08:10

## 2020-02-23 RX ADMIN — IOHEXOL 100 ML: 350 INJECTION, SOLUTION INTRAVENOUS at 00:08

## 2020-02-23 RX ADMIN — PIPERACILLIN AND TAZOBACTAM 3.38 G: 3; .375 INJECTION, POWDER, LYOPHILIZED, FOR SOLUTION INTRAVENOUS at 23:40

## 2020-02-23 RX ADMIN — METRONIDAZOLE 500 MG: 500 INJECTION, SOLUTION INTRAVENOUS at 15:32

## 2020-02-23 RX ADMIN — HYDROMORPHONE HYDROCHLORIDE 1 MG: 2 INJECTION INTRAMUSCULAR; INTRAVENOUS; SUBCUTANEOUS at 09:02

## 2020-02-23 RX ADMIN — PIPERACILLIN AND TAZOBACTAM 3.38 G: 3; .375 INJECTION, POWDER, LYOPHILIZED, FOR SOLUTION INTRAVENOUS at 14:32

## 2020-02-23 RX ADMIN — POTASSIUM CHLORIDE 40 MEQ: 29.8 INJECTION, SOLUTION INTRAVENOUS at 10:14

## 2020-02-23 NOTE — PROGRESS NOTES
Progress Note - General Surgery   Aysha Escobedo 62 y o  female MRN: [id number]  Unit/Bed#: -01 Encounter: [id number]    Assessment:  Sigmoid colon diverticulitis    Plan:  I recommend continuing current care  Decrease to NPO  CT scan abdomen and pelvis reviewed  There is some slight increase in stranding of the pericolic area in the sigmoid colon  With some dilated bowel loops  The patient's white blood cell count is improving  Her exam is relatively stable  Will continue with serial white blood cell counts and serial exams  If the patient does not improve then surgery will be indicated  Subjective/Objective   Chief Complaint: abdominal pain    Subjective: The patient complains of continued abdominal pain and nausea  She denies any vomiting  Last evening she complained of bloating and was seen any repeat CT scan abdomen and pelvis was performed  She states the pain remained stable  Objective:     Blood pressure 110/56, pulse 74, temperature 98 4 °F (36 9 °C), resp  rate 18, height 5' 6" (1 676 m), weight 74 6 kg (164 lb 7 4 oz), SpO2 92 %  ,Body mass index is 26 55 kg/m²  Intake/Output Summary (Last 24 hours) at 2/23/2020 0848  Last data filed at 2/23/2020 0831  Gross per 24 hour   Intake 2500 ml   Output    Net 2500 ml       Invasive Devices     Peripheral Intravenous Line            Peripheral IV 02/21/20 Right Antecubital 1 day                Physical Exam:  Abdomen: The entire lower abdomen is tender to palpation  There is some slight distention  There is no rebound  No masses  Lab, Imaging and other studies:  I have personally reviewed pertinent lab results    , CBC:   Lab Results   Component Value Date    WBC 15 17 (H) 02/23/2020    HGB 12 4 02/23/2020    HCT 37 6 02/23/2020    MCV 94 02/23/2020     02/23/2020    MCH 31 1 02/23/2020    MCHC 33 0 02/23/2020    RDW 11 9 02/23/2020    MPV 10 5 02/23/2020    NRBC 0 02/23/2020   , CMP:   Lab Results   Component Value Date SODIUM 139 02/23/2020    K 3 2 (L) 02/23/2020     02/23/2020    CO2 26 02/23/2020    BUN 8 02/23/2020    CREATININE 0 61 02/23/2020    CALCIUM 7 9 (L) 02/23/2020    EGFR 100 02/23/2020     VTE Pharmacologic Prophylaxis: Enoxaparin (Lovenox)

## 2020-02-23 NOTE — ASSESSMENT & PLAN NOTE
WBCs trending down, 15 17  Continue IV fluid and IV antibiotic for sigmoid diverticulitis  Monitor vital

## 2020-02-23 NOTE — ASSESSMENT & PLAN NOTE
Potassium level is 3 2  Continue IV supplement  Most likely secondary to sigmoid diverticulitis  Monitor BM

## 2020-02-23 NOTE — ASSESSMENT & PLAN NOTE
-Overnight patient was having distended abdomen  -Repeat CT scan was done: Worsening fat stranding in the region of diverticulitis with a few small foci of air indicating microperforation   Recommend colonoscopy upon resolution of acute syndrome to ensure there is no underlying lesion  No abscess visualized  Interval development of dilated loops of small bowel favor ileus over obstruction    Small volume ascites  -surgery consult appreciated  -keep patient NPO  -continue fluid and antibiotics  -serial abdominal exam

## 2020-02-23 NOTE — PROGRESS NOTES
Progress Note - Trinity Prado 1960, 62 y o  female MRN: [id number]    Unit/Bed#: -01 Encounter: [id number]    Primary Care Provider: Angelita Murguia MD   Date and time admitted to hospital: 2/21/2020  9:12 AM        * Sigmoid diverticulitis  Assessment & Plan  -Overnight patient was having distended abdomen  -Repeat CT scan was done: Worsening fat stranding in the region of diverticulitis with a few small foci of air indicating microperforation   Recommend colonoscopy upon resolution of acute syndrome to ensure there is no underlying lesion  No abscess visualized  Interval development of dilated loops of small bowel favor ileus over obstruction    Small volume ascites  -surgery consult appreciated  -keep patient NPO  -continue fluid and antibiotics  -serial abdominal exam      Hypocalcemia  Assessment & Plan  Secondary to low albumin level  Calcium level is 7 9, corrected calcium is 8 7    Hypokalemia  Assessment & Plan  Potassium level is 3 2  Continue IV supplement  Most likely secondary to sigmoid diverticulitis  Monitor BM    Idiopathic hypotension  Assessment & Plan  Closely monitor vitals  Continue IV fluid due to sigmoid diverticulitis    Nausea  Assessment & Plan  Still feels nauseated  Keep patient NPO with IV fluid coverage    Bandemia  Assessment & Plan  WBCs trending down, 15 17  Continue IV fluid and IV antibiotic for sigmoid diverticulitis  Monitor vital    Calculus of gallbladder without cholecystitis without obstruction  Assessment & Plan  Asymptomatic  Surgery consult appreciated  Continue conservative management    Spinal stenosis  Assessment & Plan  As per patient, her whole spine is affected  Patient has titanium rods in cervical spine- NO MRI  Continue pain management    Post laminectomy syndrome  Assessment & Plan  Continue conservative management  Continue pain medication for pain management      VTE Pharmacologic Prophylaxis:   Pharmacologic: Enoxaparin (Lovenox)  Mechanical VTE Prophylaxis in Place: Yes    Patient Centered Rounds: I have performed bedside rounds with nursing staff today  Discussions with Specialists or Other Care Team Provider:  General surgery    Time Spent for Care: 30 minutes  More than 50% of total time spent on counseling and coordination of care as described above  Current Length of Stay: 2 day(s)    Current Patient Status: Inpatient   Certification Statement: The patient will continue to require additional inpatient hospital stay due to Sigmoid diverticulitis, hypokalemia, hypocalcemia, gallstone, bandemia    Discharge Plan / Estimated Discharge Date: To be determined      Code Status: Level 1 - Full Code      Subjective:   Seen and evaluated during the round  Complaining of abdominal pain and distention  Patient reports she just have bowel movements with loose stool  Denies any blood  Reports this to color is brown  Denies any fever,    Objective:     Vitals:   Temp (24hrs), Av 1 °F (37 3 °C), Min:98 3 °F (36 8 °C), Max:100 9 °F (38 3 °C)    Temp:  [98 3 °F (36 8 °C)-100 9 °F (38 3 °C)] 98 4 °F (36 9 °C)  HR:  [71-81] 74  Resp:  [18-20] 18  BP: ()/(50-56) 110/56  SpO2:  [92 %-95 %] 92 %  Body mass index is 26 55 kg/m²  Input and Output Summary (last 24 hours): Intake/Output Summary (Last 24 hours) at 2020 0949  Last data filed at 2020 0831  Gross per 24 hour   Intake 2500 ml   Output    Net 2500 ml       Physical Exam:     Physical Exam   Constitutional: She is oriented to person, place, and time  She appears distressed (in discomfort)  HENT:   Mouth/Throat: Oropharynx is clear and moist  No oropharyngeal exudate  Eyes: Pupils are equal, round, and reactive to light  Conjunctivae and EOM are normal  No scleral icterus  Neck: Normal range of motion  Neck supple  No JVD present  Cardiovascular: Normal rate  Exam reveals no gallop and no friction rub     Pulmonary/Chest: Effort normal and breath sounds normal  No stridor  No respiratory distress  She has no wheezes  She has no rales  Abdominal: Soft  She exhibits distension  She exhibits no mass  There is tenderness  There is rebound and guarding  No visible peristalsis, tympanic on percussion   Musculoskeletal: Normal range of motion  She exhibits no edema  Neurological: She is alert and oriented to person, place, and time  She displays normal reflexes  No cranial nerve deficit  She exhibits normal muscle tone  Coordination normal    Skin: Skin is warm  Capillary refill takes less than 2 seconds  Psychiatric: She has a normal mood and affect  Nursing note and vitals reviewed  Additional Data:     Labs:    Results from last 7 days   Lab Units 02/23/20  0442   WBC Thousand/uL 15 17*   HEMOGLOBIN g/dL 12 4   HEMATOCRIT % 37 6   PLATELETS Thousands/uL 211   NEUTROS PCT % 90*   LYMPHS PCT % 5*   MONOS PCT % 4   EOS PCT % 0     Results from last 7 days   Lab Units 02/23/20  0442 02/22/20  0448   POTASSIUM mmol/L 3 2* 3 2*   CHLORIDE mmol/L 105 104   CO2 mmol/L 26 26   BUN mg/dL 8 10   CREATININE mg/dL 0 61 0 76   CALCIUM mg/dL 7 9* 7 8*   ALK PHOS U/L  --  41*   ALT U/L  --  15   AST U/L  --  11     Results from last 7 days   Lab Units 02/21/20  0932   INR  0 91       * I Have Reviewed All Lab Data Listed Above  * Additional Pertinent Lab Tests Reviewed: All Labs Within Last 24 Hours Reviewed    Imaging:    Imaging Reports Reviewed Today Include: Worsening fat stranding in the region of diverticulitis with a few small foci of air indicating microperforation   Recommend colonoscopy upon resolution of acute syndrome to ensure there is no underlying lesion  No abscess visualized  Interval development of dilated loops of small bowel favor ileus over obstruction        Last 24 Hours Medication List:     Current Facility-Administered Medications:  acetaminophen 650 mg Oral Q6H PRN Mauricio Wise MD    enoxaparin 40 mg Subcutaneous Daily Brand MD Nelia HYDROmorphone 1 mg Intravenous Q4H PRN Doroteo Qiu MD    piperacillin-tazobactam 3 375 g Intravenous Q6H Zelalem Wise MD Last Rate: 3 375 g (02/23/20 0521)   And        metroNIDAZOLE 500 mg Intravenous Q8H Alysia Wise MD Last Rate: 500 mg (02/23/20 0331)   ondansetron 4 mg Intravenous Q6H PRN Doroteo Qiu MD    oxyCODONE 10 mg Oral Daily Doroteo Qiu MD    pantoprazole 40 mg Intravenous Q24H [address] Alysia Wise MD    potassium chloride 40 mEq Intravenous Once Doroteo Qiu MD    sodium chloride 125 mL/hr Intravenous Continuous Doroteo Qiu MD Last Rate: 125 mL/hr (02/23/20 0831)        Today, Patient Was Seen By: Doroteo Qiu MD    ** Please Note: Dragon 360 Dictation voice to text software may have been used in the creation of this document   **

## 2020-02-23 NOTE — NURSING NOTE
Patient complaining of increased abdominal pain and bloating  Pain not relieved with meds  Abdomen distended  Paged Dr Polo Garcia  26  Dr Polo Garcia at patient bedside  CT abd pelvis ordered  Late entry due to patient care

## 2020-02-24 LAB
ALBUMIN SERPL BCP-MCNC: 2.5 G/DL (ref 3.5–5)
ALP SERPL-CCNC: 47 U/L (ref 46–116)
ALT SERPL W P-5'-P-CCNC: 13 U/L (ref 12–78)
ANION GAP SERPL CALCULATED.3IONS-SCNC: 8 MMOL/L (ref 4–13)
AST SERPL W P-5'-P-CCNC: 12 U/L (ref 5–45)
BASOPHILS # BLD AUTO: 0.02 THOUSANDS/ΜL (ref 0–0.1)
BASOPHILS NFR BLD AUTO: 0 % (ref 0–1)
BILIRUB SERPL-MCNC: 0.42 MG/DL (ref 0.2–1)
BUN SERPL-MCNC: 11 MG/DL (ref 5–25)
CALCIUM SERPL-MCNC: 8.1 MG/DL (ref 8.3–10.1)
CHLORIDE SERPL-SCNC: 107 MMOL/L (ref 100–108)
CO2 SERPL-SCNC: 27 MMOL/L (ref 21–32)
CREAT SERPL-MCNC: 0.55 MG/DL (ref 0.6–1.3)
EOSINOPHIL # BLD AUTO: 0.01 THOUSAND/ΜL (ref 0–0.61)
EOSINOPHIL NFR BLD AUTO: 0 % (ref 0–6)
ERYTHROCYTE [DISTWIDTH] IN BLOOD BY AUTOMATED COUNT: 12.1 % (ref 11.6–15.1)
GFR SERPL CREATININE-BSD FRML MDRD: 103 ML/MIN/1.73SQ M
GLUCOSE SERPL-MCNC: 99 MG/DL (ref 65–140)
HCT VFR BLD AUTO: 38.2 % (ref 34.8–46.1)
HGB BLD-MCNC: 12.7 G/DL (ref 11.5–15.4)
IMM GRANULOCYTES # BLD AUTO: 0.09 THOUSAND/UL (ref 0–0.2)
IMM GRANULOCYTES NFR BLD AUTO: 1 % (ref 0–2)
LYMPHOCYTES # BLD AUTO: 0.8 THOUSANDS/ΜL (ref 0.6–4.47)
LYMPHOCYTES NFR BLD AUTO: 6 % (ref 14–44)
MCH RBC QN AUTO: 31.2 PG (ref 26.8–34.3)
MCHC RBC AUTO-ENTMCNC: 33.2 G/DL (ref 31.4–37.4)
MCV RBC AUTO: 94 FL (ref 82–98)
MONOCYTES # BLD AUTO: 0.6 THOUSAND/ΜL (ref 0.17–1.22)
MONOCYTES NFR BLD AUTO: 5 % (ref 4–12)
NEUTROPHILS # BLD AUTO: 11.94 THOUSANDS/ΜL (ref 1.85–7.62)
NEUTS SEG NFR BLD AUTO: 88 % (ref 43–75)
NRBC BLD AUTO-RTO: 0 /100 WBCS
PLATELET # BLD AUTO: 259 THOUSANDS/UL (ref 149–390)
PMV BLD AUTO: 10.5 FL (ref 8.9–12.7)
POTASSIUM SERPL-SCNC: 3.7 MMOL/L (ref 3.5–5.3)
PROT SERPL-MCNC: 6 G/DL (ref 6.4–8.2)
RBC # BLD AUTO: 4.07 MILLION/UL (ref 3.81–5.12)
SODIUM SERPL-SCNC: 142 MMOL/L (ref 136–145)
WBC # BLD AUTO: 13.46 THOUSAND/UL (ref 4.31–10.16)

## 2020-02-24 PROCEDURE — C9113 INJ PANTOPRAZOLE SODIUM, VIA: HCPCS | Performed by: FAMILY MEDICINE

## 2020-02-24 PROCEDURE — 99232 SBSQ HOSP IP/OBS MODERATE 35: CPT | Performed by: FAMILY MEDICINE

## 2020-02-24 PROCEDURE — 85025 COMPLETE CBC W/AUTO DIFF WBC: CPT | Performed by: SURGERY

## 2020-02-24 PROCEDURE — 80053 COMPREHEN METABOLIC PANEL: CPT | Performed by: FAMILY MEDICINE

## 2020-02-24 RX ADMIN — OXYCODONE HYDROCHLORIDE 10 MG: 10 TABLET ORAL at 08:06

## 2020-02-24 RX ADMIN — HYDROMORPHONE HYDROCHLORIDE 1 MG: 2 INJECTION INTRAMUSCULAR; INTRAVENOUS; SUBCUTANEOUS at 15:20

## 2020-02-24 RX ADMIN — HYDROMORPHONE HYDROCHLORIDE 1 MG: 2 INJECTION INTRAMUSCULAR; INTRAVENOUS; SUBCUTANEOUS at 06:41

## 2020-02-24 RX ADMIN — PIPERACILLIN AND TAZOBACTAM 3.38 G: 3; .375 INJECTION, POWDER, LYOPHILIZED, FOR SOLUTION INTRAVENOUS at 10:53

## 2020-02-24 RX ADMIN — SODIUM CHLORIDE 125 ML/HR: 0.9 INJECTION, SOLUTION INTRAVENOUS at 08:13

## 2020-02-24 RX ADMIN — ENOXAPARIN SODIUM 40 MG: 40 INJECTION SUBCUTANEOUS at 08:06

## 2020-02-24 RX ADMIN — METRONIDAZOLE 500 MG: 500 INJECTION, SOLUTION INTRAVENOUS at 19:02

## 2020-02-24 RX ADMIN — PIPERACILLIN AND TAZOBACTAM 3.38 G: 3; .375 INJECTION, POWDER, LYOPHILIZED, FOR SOLUTION INTRAVENOUS at 05:28

## 2020-02-24 RX ADMIN — METRONIDAZOLE 500 MG: 500 INJECTION, SOLUTION INTRAVENOUS at 11:52

## 2020-02-24 RX ADMIN — PIPERACILLIN AND TAZOBACTAM 3.38 G: 3; .375 INJECTION, POWDER, LYOPHILIZED, FOR SOLUTION INTRAVENOUS at 23:40

## 2020-02-24 RX ADMIN — SODIUM CHLORIDE 125 ML/HR: 0.9 INJECTION, SOLUTION INTRAVENOUS at 16:50

## 2020-02-24 RX ADMIN — ONDANSETRON 4 MG: 2 INJECTION INTRAMUSCULAR; INTRAVENOUS at 19:36

## 2020-02-24 RX ADMIN — HYDROMORPHONE HYDROCHLORIDE 1 MG: 2 INJECTION INTRAMUSCULAR; INTRAVENOUS; SUBCUTANEOUS at 22:02

## 2020-02-24 RX ADMIN — PANTOPRAZOLE SODIUM 40 MG: 40 INJECTION, POWDER, FOR SOLUTION INTRAVENOUS at 08:06

## 2020-02-24 RX ADMIN — METRONIDAZOLE 500 MG: 500 INJECTION, SOLUTION INTRAVENOUS at 03:56

## 2020-02-24 RX ADMIN — PIPERACILLIN AND TAZOBACTAM 3.38 G: 3; .375 INJECTION, POWDER, LYOPHILIZED, FOR SOLUTION INTRAVENOUS at 16:49

## 2020-02-24 NOTE — ASSESSMENT & PLAN NOTE
No fever noted  WBCs trending down  Continue IV fluid and IV antibiotic for sigmoid diverticulitis  Monitor vital

## 2020-02-24 NOTE — PROGRESS NOTES
Progress Note - General Surgery   Mimi Goldsmith 62 y o  female MRN: [id number]  Unit/Bed#: -01 Encounter: [id number]    Assessment:  - Sigmoid colon diverticulitis  - Pain improving, well-controlled  - Pt having liquid bowel movements  - Leukocytosis 13 46 (improving)    Plan:  - Continue current IV antibiotics, current medications, and IV fluids  - Bowel rest until function improvement  - Advance to clear liquid diet  - Continue current care and monitor  - Serial CBC and exams  - General surgery to continue to follow    Subjective/Objective   Subjective: Pt states pain improved and well controlled with medications, rates it a 2/10 right now  She reports 4-5 liquid bowel movements within the last 24 hours  She denies any blood in stool  Bloating still present but improved  States she passes liquid stool and gas when she urinates  Denies N/V/C, difficulty urinating, fever, chills, or fatigue  Objective:     Blood pressure 115/73, pulse 69, temperature 98 3 °F (36 8 °C), resp  rate 18, height 5' 6" (1 676 m), weight 74 6 kg (164 lb 7 4 oz), SpO2 96 %  ,Body mass index is 26 55 kg/m²        Intake/Output Summary (Last 24 hours) at 2/24/2020 0804  Last data filed at 2/23/2020 0831  Gross per 24 hour   Intake 1000 ml   Output    Net 1000 ml       Invasive Devices     Peripheral Intravenous Line            Peripheral IV 02/21/20 Right Antecubital 2 days              Scheduled Meds:  Current Facility-Administered Medications:  acetaminophen 650 mg Oral Q6H PRN Abrahan Wise MD    enoxaparin 40 mg Subcutaneous Daily Alysia Wise MD    HYDROmorphone 1 mg Intravenous Q4H PRN Sebastian Bishop MD    piperacillin-tazobactam 3 375 g Intravenous Q6H Abrahan Wise MD Last Rate: 3 375 g (02/24/20 0560)   And        metroNIDAZOLE 500 mg Intravenous Q8H Alysia Wise MD Last Rate: 500 mg (02/24/20 2476)   ondansetron 4 mg Intravenous Q6H PRN Sebastian Bishop MD    oxyCODONE 10 mg Oral Daily Alysia MONTES MD Billie    pantoprazole 40 mg Intravenous Q24H [facility] Alysia Wise MD    sodium chloride 125 mL/hr Intravenous Continuous Marylu Bustos MD Last Rate: 125 mL/hr (02/23/20 0831)     Continuous Infusions:  sodium chloride 125 mL/hr Last Rate: 125 mL/hr (02/23/20 0831)     PRN Meds:   acetaminophen    HYDROmorphone    ondansetron      Physical Exam: General appearance: alert and oriented, in no acute distress  Lungs: clear to auscultation bilaterally  Heart: regular rate and rhythm, S1, S2 normal, no murmur, click, rub or gallop  Abdomen: normal findings: bowel sounds normal, no masses palpable, no organomegaly and no rigidity or guarding and abnormal findings:  distended and moderate tenderness in the LUQ and in the LLQ  Skin: Skin color, texture, turgor normal  No rashes or lesions    Lab, Imaging and other studies:  I have personally reviewed pertinent lab results      CBC:   Lab Results   Component Value Date    WBC 13 46 (H) 02/24/2020    HGB 12 7 02/24/2020    HCT 38 2 02/24/2020    MCV 94 02/24/2020     02/24/2020    MCH 31 2 02/24/2020    MCHC 33 2 02/24/2020    RDW 12 1 02/24/2020    MPV 10 5 02/24/2020    NRBC 0 02/24/2020   CMP:   Lab Results   Component Value Date    SODIUM 142 02/24/2020    K 3 7 02/24/2020     02/24/2020    CO2 27 02/24/2020    BUN 11 02/24/2020    CREATININE 0 55 (L) 02/24/2020    CALCIUM 8 1 (L) 02/24/2020    AST 12 02/24/2020    ALT 13 02/24/2020    ALKPHOS 47 02/24/2020    EGFR 103 02/24/2020     VTE Pharmacologic Prophylaxis: Enoxaparin (Lovenox)  VTE Mechanical Prophylaxis: sequential compression device     Rustam Pacheco PA-C

## 2020-02-24 NOTE — PROGRESS NOTES
Progress Note - Ashli Weaver 1960, 62 y o  female MRN: [id number]    Unit/Bed#: -01 Encounter: [id number]    Primary Care Provider: Veronica Brandt MD   Date and time admitted to hospital: 2/21/2020  9:12 AM        * Sigmoid diverticulitis  Assessment & Plan  -patient is still having mild distended abdomen  -after discussing with surgery, will advance the diet to clear liquid  -continue IV fluid, IV antibiotic  -continue serial abdominal exam      Hypocalcemia  Assessment & Plan  Improving  No clinical manifestations of hypocalcemia    Hypokalemia  Assessment & Plan  Improved with supplement  Patient is on IV fluid  Monitor labs    Idiopathic hypotension  Assessment & Plan  Blood pressure improved  Patient is on IV fluid-continue    Nausea  Assessment & Plan  Still feels nauseated      Bandemia  Assessment & Plan  No fever noted  WBCs trending down  Continue IV fluid and IV antibiotic for sigmoid diverticulitis  Monitor vital    Calculus of gallbladder without cholecystitis without obstruction  Assessment & Plan  Asymptomatic  Surgery consult appreciated  Continue conservative management    Spinal stenosis  Assessment & Plan  As per patient, her whole spine is affected  Patient has titanium rods in cervical spine- NO MRI  Continue pain management    Post laminectomy syndrome  Assessment & Plan  Continue conservative management  Continue pain medication for pain management      VTE Pharmacologic Prophylaxis:   Pharmacologic: Enoxaparin (Lovenox)  Mechanical VTE Prophylaxis in Place: Yes    Patient Centered Rounds: I have performed bedside rounds with nursing staff today  Discussions with Specialists or Other Care Team Provider:  Surgery    Time Spent for Care: 30 minutes  More than 50% of total time spent on counseling and coordination of care as described above      Current Length of Stay: 3 day(s)    Current Patient Status: Inpatient   Certification Statement: The patient will continue to require additional inpatient hospital stay due to Sigmoid diverticulitis, bandemia, nausea, hypocalcemia, hypokalemia, hypertension    Discharge Plan / Estimated Discharge Date: To be determined      Code Status: Level 1 - Full Code      Subjective:   Seen and evaluated during the round  Still complaining of mild abdominal pain and distention  But reports she had good bowel movements, denies any blood  Denies any significant vomiting, any fever    Objective:     Vitals:   Temp (24hrs), Av 5 °F (36 9 °C), Min:98 3 °F (36 8 °C), Max:98 6 °F (37 °C)    Temp:  [98 3 °F (36 8 °C)-98 6 °F (37 °C)] 98 3 °F (36 8 °C)  HR:  [69-78] 69  Resp:  [18] 18  BP: (103-115)/(54-73) 115/73  SpO2:  [96 %] 96 %  Body mass index is 26 55 kg/m²  Input and Output Summary (last 24 hours):     No intake or output data in the 24 hours ending 20 1003    Physical Exam:     Physical Exam   Constitutional: She is oriented to person, place, and time  She appears well-developed  No distress  HENT:   Mouth/Throat: Oropharynx is clear and moist  No oropharyngeal exudate  Eyes: Pupils are equal, round, and reactive to light  Conjunctivae and EOM are normal  No scleral icterus  Neck: Normal range of motion  Neck supple  No JVD present  Cardiovascular: Normal rate  Exam reveals no gallop and no friction rub  No murmur heard  Pulmonary/Chest: Effort normal and breath sounds normal  No stridor  No respiratory distress  She has no wheezes  Abdominal: Soft  She exhibits distension  There is tenderness  There is no rebound and no guarding  No hernia  Musculoskeletal: Normal range of motion  She exhibits no edema  Neurological: She is alert and oriented to person, place, and time  She displays normal reflexes  No cranial nerve deficit  She exhibits normal muscle tone  Coordination normal    Skin: Skin is warm  Capillary refill takes less than 2 seconds  Psychiatric: She has a normal mood and affect     Nursing note and vitals reviewed  Additional Data:     Labs:    Results from last 7 days   Lab Units 02/24/20  0442   WBC Thousand/uL 13 46*   HEMOGLOBIN g/dL 12 7   HEMATOCRIT % 38 2   PLATELETS Thousands/uL 259   NEUTROS PCT % 88*   LYMPHS PCT % 6*   MONOS PCT % 5   EOS PCT % 0     Results from last 7 days   Lab Units 02/24/20  0442   POTASSIUM mmol/L 3 7   CHLORIDE mmol/L 107   CO2 mmol/L 27   BUN mg/dL 11   CREATININE mg/dL 0 55*   CALCIUM mg/dL 8 1*   ALK PHOS U/L 47   ALT U/L 13   AST U/L 12     Results from last 7 days   Lab Units 02/21/20  0932   INR  0 91       * I Have Reviewed All Lab Data Listed Above  * Additional Pertinent Lab Tests Reviewed: All Labs Within Last 24 Hours Reviewed      Last 24 Hours Medication List:     Current Facility-Administered Medications:  acetaminophen 650 mg Oral Q6H PRN Alicia Wise MD    enoxaparin 40 mg Subcutaneous Daily Alysia Wise MD    HYDROmorphone 1 mg Intravenous Q4H PRN Kenisha Leonard MD    piperacillin-tazobactam 3 375 g Intravenous Q6H Alicia Wise MD Last Rate: 3 375 g (02/24/20 0528)   And        metroNIDAZOLE 500 mg Intravenous Q8H Alysia Wise MD Last Rate: 500 mg (02/24/20 0356)   ondansetron 4 mg Intravenous Q6H PRN Kenisha Leonard MD    oxyCODONE 10 mg Oral Daily Kenisha Leonard MD    pantoprazole 40 mg Intravenous Q24H [facility] Alysia Wise MD    sodium chloride 125 mL/hr Intravenous Continuous Kenisha Leonard MD Last Rate: 125 mL/hr (02/24/20 0813)        Today, Patient Was Seen By: Kenisha Leonard MD    ** Please Note: Dragon 360 Dictation voice to text software may have been used in the creation of this document   **

## 2020-02-24 NOTE — PROGRESS NOTES
Provided diet ed for low fiber diet, handout provided  RD does not have protocol to order supplements, asked MD via tiger text to order ensure clear TID which was agreeable by pt

## 2020-02-24 NOTE — ASSESSMENT & PLAN NOTE
-patient is still having mild distended abdomen  -after discussing with surgery, will advance the diet to clear liquid  -continue IV fluid, IV antibiotic  -continue serial abdominal exam

## 2020-02-25 LAB
ALBUMIN SERPL BCP-MCNC: 2.3 G/DL (ref 3.5–5)
ALP SERPL-CCNC: 35 U/L (ref 46–116)
ALT SERPL W P-5'-P-CCNC: 16 U/L (ref 12–78)
ANION GAP SERPL CALCULATED.3IONS-SCNC: 4 MMOL/L (ref 4–13)
AST SERPL W P-5'-P-CCNC: 10 U/L (ref 5–45)
BASOPHILS # BLD AUTO: 0.01 THOUSANDS/ΜL (ref 0–0.1)
BASOPHILS NFR BLD AUTO: 0 % (ref 0–1)
BILIRUB SERPL-MCNC: 0.3 MG/DL (ref 0.2–1)
BUN SERPL-MCNC: 8 MG/DL (ref 5–25)
CALCIUM SERPL-MCNC: 7.7 MG/DL (ref 8.3–10.1)
CHLORIDE SERPL-SCNC: 108 MMOL/L (ref 100–108)
CO2 SERPL-SCNC: 29 MMOL/L (ref 21–32)
CREAT SERPL-MCNC: 0.63 MG/DL (ref 0.6–1.3)
EOSINOPHIL # BLD AUTO: 0 THOUSAND/ΜL (ref 0–0.61)
EOSINOPHIL NFR BLD AUTO: 0 % (ref 0–6)
ERYTHROCYTE [DISTWIDTH] IN BLOOD BY AUTOMATED COUNT: 12.2 % (ref 11.6–15.1)
GFR SERPL CREATININE-BSD FRML MDRD: 98 ML/MIN/1.73SQ M
GLUCOSE SERPL-MCNC: 116 MG/DL (ref 65–140)
HCT VFR BLD AUTO: 35.6 % (ref 34.8–46.1)
HGB BLD-MCNC: 11.7 G/DL (ref 11.5–15.4)
IMM GRANULOCYTES # BLD AUTO: 0.02 THOUSAND/UL (ref 0–0.2)
IMM GRANULOCYTES NFR BLD AUTO: 0 % (ref 0–2)
LYMPHOCYTES # BLD AUTO: 0.78 THOUSANDS/ΜL (ref 0.6–4.47)
LYMPHOCYTES NFR BLD AUTO: 8 % (ref 14–44)
MCH RBC QN AUTO: 30.4 PG (ref 26.8–34.3)
MCHC RBC AUTO-ENTMCNC: 32.9 G/DL (ref 31.4–37.4)
MCV RBC AUTO: 93 FL (ref 82–98)
MONOCYTES # BLD AUTO: 0.65 THOUSAND/ΜL (ref 0.17–1.22)
MONOCYTES NFR BLD AUTO: 7 % (ref 4–12)
NEUTROPHILS # BLD AUTO: 7.9 THOUSANDS/ΜL (ref 1.85–7.62)
NEUTS SEG NFR BLD AUTO: 85 % (ref 43–75)
NRBC BLD AUTO-RTO: 0 /100 WBCS
PLATELET # BLD AUTO: 260 THOUSANDS/UL (ref 149–390)
PMV BLD AUTO: 9.6 FL (ref 8.9–12.7)
POTASSIUM SERPL-SCNC: 3 MMOL/L (ref 3.5–5.3)
PROT SERPL-MCNC: 5.5 G/DL (ref 6.4–8.2)
RBC # BLD AUTO: 3.85 MILLION/UL (ref 3.81–5.12)
SODIUM SERPL-SCNC: 141 MMOL/L (ref 136–145)
WBC # BLD AUTO: 9.36 THOUSAND/UL (ref 4.31–10.16)

## 2020-02-25 PROCEDURE — C9113 INJ PANTOPRAZOLE SODIUM, VIA: HCPCS | Performed by: FAMILY MEDICINE

## 2020-02-25 PROCEDURE — 99232 SBSQ HOSP IP/OBS MODERATE 35: CPT | Performed by: INTERNAL MEDICINE

## 2020-02-25 PROCEDURE — 80053 COMPREHEN METABOLIC PANEL: CPT | Performed by: FAMILY MEDICINE

## 2020-02-25 PROCEDURE — 85025 COMPLETE CBC W/AUTO DIFF WBC: CPT | Performed by: PHYSICIAN ASSISTANT

## 2020-02-25 RX ADMIN — METRONIDAZOLE 500 MG: 500 INJECTION, SOLUTION INTRAVENOUS at 03:03

## 2020-02-25 RX ADMIN — ENOXAPARIN SODIUM 40 MG: 40 INJECTION SUBCUTANEOUS at 09:34

## 2020-02-25 RX ADMIN — PIPERACILLIN AND TAZOBACTAM 3.38 G: 3; .375 INJECTION, POWDER, LYOPHILIZED, FOR SOLUTION INTRAVENOUS at 23:12

## 2020-02-25 RX ADMIN — OXYCODONE HYDROCHLORIDE 10 MG: 10 TABLET ORAL at 09:34

## 2020-02-25 RX ADMIN — HYDROMORPHONE HYDROCHLORIDE 1 MG: 2 INJECTION INTRAMUSCULAR; INTRAVENOUS; SUBCUTANEOUS at 20:51

## 2020-02-25 RX ADMIN — METRONIDAZOLE 500 MG: 500 INJECTION, SOLUTION INTRAVENOUS at 11:19

## 2020-02-25 RX ADMIN — HYDROMORPHONE HYDROCHLORIDE 1 MG: 2 INJECTION INTRAMUSCULAR; INTRAVENOUS; SUBCUTANEOUS at 16:34

## 2020-02-25 RX ADMIN — HYDROMORPHONE HYDROCHLORIDE 1 MG: 2 INJECTION INTRAMUSCULAR; INTRAVENOUS; SUBCUTANEOUS at 05:53

## 2020-02-25 RX ADMIN — PANTOPRAZOLE SODIUM 40 MG: 40 INJECTION, POWDER, FOR SOLUTION INTRAVENOUS at 09:34

## 2020-02-25 RX ADMIN — SODIUM CHLORIDE 125 ML/HR: 0.9 INJECTION, SOLUTION INTRAVENOUS at 03:00

## 2020-02-25 RX ADMIN — PIPERACILLIN AND TAZOBACTAM 3.38 G: 3; .375 INJECTION, POWDER, LYOPHILIZED, FOR SOLUTION INTRAVENOUS at 05:42

## 2020-02-25 RX ADMIN — PIPERACILLIN AND TAZOBACTAM 3.38 G: 3; .375 INJECTION, POWDER, LYOPHILIZED, FOR SOLUTION INTRAVENOUS at 11:20

## 2020-02-25 RX ADMIN — METRONIDAZOLE 500 MG: 500 INJECTION, SOLUTION INTRAVENOUS at 18:35

## 2020-02-25 RX ADMIN — HYDROMORPHONE HYDROCHLORIDE 1 MG: 2 INJECTION INTRAMUSCULAR; INTRAVENOUS; SUBCUTANEOUS at 11:19

## 2020-02-25 RX ADMIN — PIPERACILLIN AND TAZOBACTAM 3.38 G: 3; .375 INJECTION, POWDER, LYOPHILIZED, FOR SOLUTION INTRAVENOUS at 16:29

## 2020-02-25 NOTE — UTILIZATION REVIEW
Notification of Inpatient Admission/Inpatient Authorization Request   This is a Notification of Inpatient Admission for [address]  Be advised that this patient was admitted to our facility under Inpatient Status  Contact John Ho at [phone] for additional admission information  [facility]  DEDICATED [phone]  Patient Name:   Jose F Cordova   YOB: 1960       [address]   [address]:   [address]  Tax ID: [id number]  NPI: [id number] Attending Provider/NPI: Nicolas Qureshi Md [[id number]]   Place of Service Code: 24     Place of Service Name:  [address] [place] [address]   Start Date: 2/21/20 1131     Discharge Date & Time: No discharge date for patient encounter  Type of Admission: Inpatient Status Discharge Disposition (if discharged): Final discharge disposition not confirmed   Patient Diagnoses: Diverticulitis [K57 92]  Abdominal pain [R10 9]  Leukocytosis, unspecified type [D72 829]  Left lower quadrant abdominal pain [R10 32]     Orders: Admission Orders (From admission, onward)     Ordered        02/21/20 1131  Inpatient Admission (expected length of stay for this patient Order details is greater than two midnights)  Once                    Assigned Utilization Review Contact: John Ho  Utilization   Network Utilization Review Department  Phone: [phone]; Fax [phone]  Email: [email or web address]   ATTENTION PAYERS: Please call the assigned Utilization  directly with any questions or concerns ALL voicemails in the department are confidential  Send all requests for admission clinical reviews, approved or denied determinations and any other requests to dedicated fax number belonging to the campus where the patient is receiving treatment

## 2020-02-25 NOTE — PLAN OF CARE
Problem: Nutrition/Hydration-ADULT  Goal: Nutrient/Hydration intake appropriate for improving, restoring or maintaining nutritional needs  Description  Monitor and assess patient's nutrition/hydration status for malnutrition  Collaborate with interdisciplinary team and initiate plan and interventions as ordered  Monitor patient's weight and dietary intake as ordered or per policy  Utilize nutrition screening tool and intervene as necessary  Determine patient's food preferences and provide high-protein, high-caloric foods as appropriate       INTERVENTIONS:  - Monitor oral intake, urinary output, labs, and treatment plans  - Assess nutrition and hydration status and recommend course of action  - Evaluate amount of meals eaten  - Assist patient with eating if necessary   - Allow adequate time for meals  - Recommend/ encourage appropriate diets, oral nutritional supplements, and vitamin/mineral supplements  - Order, calculate, and assess calorie counts as needed  - Recommend, monitor, and adjust tube feedings and TPN/PPN based on assessed needs  - Assess need for intravenous fluids  - Provide specific nutrition/hydration education as appropriate  - Include patient/family/caregiver in decisions related to nutrition  Outcome: Progressing     Problem: PAIN - ADULT  Goal: Verbalizes/displays adequate comfort level or baseline comfort level  Description  Interventions:  - Encourage patient to monitor pain and request assistance  - Assess pain using appropriate pain scale  - Administer analgesics based on type and severity of pain and evaluate response  - Implement non-pharmacological measures as appropriate and evaluate response  - Consider cultural and social influences on pain and pain management  - Notify physician/advanced practitioner if interventions unsuccessful or patient reports new pain  Outcome: Progressing     Problem: INFECTION - ADULT  Goal: Absence or prevention of progression during hospitalization  Description  INTERVENTIONS:  - Assess and monitor for signs and symptoms of infection  - Monitor lab/diagnostic results  - Monitor all insertion sites, i e  indwelling lines, tubes, and drains  - Monitor endotracheal if appropriate and nasal secretions for changes in amount and color  - [place] appropriate cooling/warming therapies per order  - Administer medications as ordered  - Instruct and encourage patient and family to use good hand hygiene technique  - Identify and instruct in appropriate isolation precautions for identified infection/condition  Outcome: Progressing  Goal: Absence of fever/infection during neutropenic period  Description  INTERVENTIONS:  - Monitor WBC    Outcome: Progressing     Problem: SAFETY ADULT  Goal: Patient will remain free of falls  Description  INTERVENTIONS:  - Assess patient frequently for physical needs  -  Identify cognitive and physical deficits and behaviors that affect risk of falls    -  [place] fall precautions as indicated by assessment   - Educate patient/family on patient safety including physical limitations  - Instruct patient to call for assistance with activity based on assessment  - Modify environment to reduce risk of injury  - Consider OT/PT consult to assist with strengthening/mobility  Outcome: Progressing  Goal: Maintain or return to baseline ADL function  Description  INTERVENTIONS:  -  Assess patient's ability to carry out ADLs; assess patient's baseline for ADL function and identify physical deficits which impact ability to perform ADLs (bathing, care of mouth/teeth, toileting, grooming, dressing, etc )  - Assess/evaluate cause of self-care deficits   - Assess range of motion  - Assess patient's mobility; develop plan if impaired  - Assess patient's need for assistive devices and provide as appropriate  - Encourage maximum independence but intervene and supervise when necessary  - Involve family in performance of ADLs  - Assess for home care needs following discharge   - Consider OT consult to assist with ADL evaluation and planning for discharge  - Provide patient education as appropriate  Outcome: Progressing  Goal: Maintain or return mobility status to optimal level  Description  INTERVENTIONS:  - Assess patient's baseline mobility status (ambulation, transfers, stairs, etc )    - Identify cognitive and physical deficits and behaviors that affect mobility  - Identify mobility aids required to assist with transfers and/or ambulation (gait belt, sit-to-stand, lift, walker, cane, etc )  - [place] fall precautions as indicated by assessment  - Record patient progress and toleration of activity level on Mobility SBAR; progress patient to next Phase/Stage  - Instruct patient to call for assistance with activity based on assessment  - Consider rehabilitation consult to assist with strengthening/weightbearing, etc   Outcome: Progressing     Problem: Potential for Falls  Goal: Patient will remain free of falls  Description  INTERVENTIONS:  - Assess patient frequently for physical needs  -  Identify cognitive and physical deficits and behaviors that affect risk of falls    -  [place] fall precautions as indicated by assessment   - Educate patient/family on patient safety including physical limitations  - Instruct patient to call for assistance with activity based on assessment  - Modify environment to reduce risk of injury  - Consider OT/PT consult to assist with strengthening/mobility  Outcome: Progressing

## 2020-02-25 NOTE — PROGRESS NOTES
Progress Note - Darnell Estrada 1960, 62 y o  female MRN: [id number]    Unit/Bed#: -01 Encounter: [id number]    Primary Care Provider: Makayla Grady MD   Date and time admitted to hospital: 2020  9:12 AM        * Sigmoid diverticulitis  Assessment & Plan    Clear liquid diet  -continue IV fluid, IV antibiotic  -continue serial abdominal exam  Will discuss with surgery  Calculus of gallbladder without cholecystitis without obstruction  Assessment & Plan  Asymptomatic  Surgery consult appreciated  Continue conservative management    Hypokalemia  Assessment & Plan  Improved with supplement  Patient is on IV fluid  Monitor labs    Idiopathic hypotension  Assessment & Plan  Blood pressure improved  Patient is on IV fluid-continue    Nausea  Assessment & Plan  Monitor symptoms closely continue supportive care  VTE Pharmacologic Prophylaxis:   Pharmacologic: Enoxaparin (Lovenox)  Mechanical VTE Prophylaxis in Place: No    Patient Centered Rounds: I have performed bedside rounds with nursing staff today  Time Spent for Care: 15 minutes  More than 50% of total time spent on counseling and coordination of care as described above  Current Length of Stay: 4 day(s)    Current Patient Status: Inpatient   Certification Statement: The patient will continue to require additional inpatient hospital stay due to Need to monitor symptoms        Code Status: Level 1 - Full Code      Subjective:   NAD    Objective:     Vitals:   Temp (24hrs), Av 3 °F (36 8 °C), Min:98 1 °F (36 7 °C), Max:98 4 °F (36 9 °C)    Temp:  [98 1 °F (36 7 °C)-98 4 °F (36 9 °C)] 98 1 °F (36 7 °C)  HR:  [73-74] 73  Resp:  [16-17] 16  BP: (111-116)/(67-71) 111/67  SpO2:  [89 %-93 %] 89 %  Body mass index is 26 55 kg/m²  Input and Output Summary (last 24 hours):        Intake/Output Summary (Last 24 hours) at 2020 0828  Last data filed at 2020 0300  Gross per 24 hour   Intake 5550 42 ml   Output    Net 5550 42 ml       Physical Exam:     Physical Exam   Constitutional: She is oriented to person, place, and time  HENT:   Head: Normocephalic and atraumatic  Eyes: Pupils are equal, round, and reactive to light  Neck: Normal range of motion  Neck supple  Cardiovascular: Normal rate  No murmur heard  Pulmonary/Chest: Effort normal and breath sounds normal  No stridor  No respiratory distress  Abdominal: Soft  Bowel sounds are normal  She exhibits no distension  There is no tenderness  Musculoskeletal: Normal range of motion  She exhibits no edema  Neurological: She is alert and oriented to person, place, and time  Skin: Skin is warm and dry  Additional Data:     Labs:    Results from last 7 days   Lab Units 02/25/20  0737   WBC Thousand/uL 9 36   HEMOGLOBIN g/dL 11 7   HEMATOCRIT % 35 6   PLATELETS Thousands/uL 260   NEUTROS PCT % 85*   LYMPHS PCT % 8*   MONOS PCT % 7   EOS PCT % 0     Results from last 7 days   Lab Units 02/25/20  0737   POTASSIUM mmol/L 3 0*   CHLORIDE mmol/L 108   CO2 mmol/L 29   BUN mg/dL 8   CREATININE mg/dL 0 63   CALCIUM mg/dL 7 7*   ALK PHOS U/L 35*   ALT U/L 16   AST U/L 10     Results from last 7 days   Lab Units 02/21/20  0932   INR  0 91       * I Have Reviewed All Lab Data Listed Above  * Additional Pertinent Lab Tests Reviewed:  All Labs Within Last 24 Hours Reviewed        Recent Cultures (last 7 days):           Last 24 Hours Medication List:     Current Facility-Administered Medications:  acetaminophen 650 mg Oral Q6H PRN Jim Wise MD    enoxaparin 40 mg Subcutaneous Daily Alysia Wise MD    HYDROmorphone 1 mg Intravenous Q4H PRN Esha Durand MD    piperacillin-tazobactam 3 375 g Intravenous Q6H Jim Wise MD Last Rate: 3 375 g (02/25/20 0542)   And        metroNIDAZOLE 500 mg Intravenous Q8H Alysia Wise MD Last Rate: 500 mg (02/25/20 0303)   ondansetron 4 mg Intravenous Q6H PRN Esha Durand MD    oxyCODONE 10 mg Oral Daily Alysia Peter Peña MD    pantoprazole 40 mg Intravenous Q24H [address] MD Billie    sodium chloride 125 mL/hr Intravenous Continuous Christk Scott MD Last Rate: 125 mL/hr (02/25/20 0300)        Today, Patient Was Seen By: Danita Trevino DO    ** Please Note: Dictation voice to text software may have been used in the creation of this document   **

## 2020-02-25 NOTE — ASSESSMENT & PLAN NOTE
Clear liquid diet  -continue IV fluid, IV antibiotic  -continue serial abdominal exam  Will discuss with surgery

## 2020-02-25 NOTE — PROGRESS NOTES
Progress Note - General Surgery   Jameson Way 62 y o  female MRN: [id number]  Unit/Bed#: -01 Encounter: [id number]    Assessment:  - Sigmoid diverticulitis  - Pain much improved, well controlled  - Tolerating clear liquid diet   - No bowel movements since last seen by me yesterday  - Leukocytosis resolved 9 36    Plan:  - Continue current IV antibiotics, current medications, and IV fluids   - Advance diet to surgical soft diet and monitor  - Continue current care  - Serial CBC and exams  Subjective/Objective   Subjective: Pt feeling better today  She states her pain is improving and she is taking less pain medication  She recalls her last dose of pain meds this morning  Pain currently 3/10  Still no bowel movement since Sunday  Says she is hungry for solid food  Pt states she is not having problems with her clear liquid diet besides not having an appetite for that specific food  Pt feels bloated but passing gas  Urinating okay  Denies N/V/D/C, fever, chills, CP, SOB, fatigue  Objective:     Blood pressure 130/74, pulse 64, temperature 98 3 °F (36 8 °C), resp  rate 18, height 5' 6" (1 676 m), weight 74 6 kg (164 lb 7 4 oz), SpO2 90 %  ,Body mass index is 26 55 kg/m²  Intake/Output Summary (Last 24 hours) at 2/25/2020 0921  Last data filed at 2/25/2020 0300  Gross per 24 hour   Intake 5550 42 ml   Output    Net 5550 42 ml       Invasive Devices     Peripheral Intravenous Line            Peripheral IV 02/25/20 Dorsal (posterior); Right Hand less than 1 day              Scheduled Meds:  Current Facility-Administered Medications:  acetaminophen 650 mg Oral Q6H PRN Yanna Wise MD    enoxaparin 40 mg Subcutaneous Daily Alysia Wise MD    HYDROmorphone 1 mg Intravenous Q4H PRN Juan Luis Romero MD    piperacillin-tazobactam 3 375 g Intravenous Q6H Yanna Wise MD Last Rate: 3 375 g (02/25/20 0542)   And        metroNIDAZOLE 500 mg Intravenous Q8H Alysia Wise MD Last Rate: 500 mg (02/25/20 0303)   ondansetron 4 mg Intravenous Q6H PRN Milagro Han MD    oxyCODONE 10 mg Oral Daily Alysia Wise MD    pantoprazole 40 mg Intravenous Q24H [address] Alysia Wise MD    sodium chloride 125 mL/hr Intravenous Continuous Milagro Han MD Last Rate: 125 mL/hr (02/25/20 0300)     Continuous Infusions:  sodium chloride 125 mL/hr Last Rate: 125 mL/hr (02/25/20 0300)     PRN Meds:   acetaminophen    HYDROmorphone    ondansetron    Physical Exam: General appearance: alert and oriented, in no acute distress  Lungs: clear to auscultation bilaterally  Heart: regular rate and rhythm, S1, S2 normal, no murmur, click, rub or gallop  Abdomen: soft, non-tender; bowel sounds normal; no masses,  no organomegaly    Lab, Imaging and other studies:  I have personally reviewed pertinent lab results    , CBC:   Lab Results   Component Value Date    WBC 9 36 02/25/2020    HGB 11 7 02/25/2020    HCT 35 6 02/25/2020    MCV 93 02/25/2020     02/25/2020    MCH 30 4 02/25/2020    MCHC 32 9 02/25/2020    RDW 12 2 02/25/2020    MPV 9 6 02/25/2020    NRBC 0 02/25/2020   , CMP:   Lab Results   Component Value Date    SODIUM 141 02/25/2020    K 3 0 (L) 02/25/2020     02/25/2020    CO2 29 02/25/2020    BUN 8 02/25/2020    CREATININE 0 63 02/25/2020    CALCIUM 7 7 (L) 02/25/2020    AST 10 02/25/2020    ALT 16 02/25/2020    ALKPHOS 35 (L) 02/25/2020    EGFR 98 02/25/2020     VTE Pharmacologic Prophylaxis: Enoxaparin (Lovenox)  VTE Mechanical Prophylaxis: sequential compression device    Rustam Pacheco PA-C

## 2020-02-26 LAB
ALBUMIN SERPL BCP-MCNC: 2.3 G/DL (ref 3.5–5)
ALP SERPL-CCNC: 32 U/L (ref 46–116)
ALT SERPL W P-5'-P-CCNC: 14 U/L (ref 12–78)
ANION GAP SERPL CALCULATED.3IONS-SCNC: 4 MMOL/L (ref 4–13)
ANION GAP SERPL CALCULATED.3IONS-SCNC: 6 MMOL/L (ref 4–13)
AST SERPL W P-5'-P-CCNC: 14 U/L (ref 5–45)
BASOPHILS # BLD AUTO: 0.02 THOUSANDS/ΜL (ref 0–0.1)
BASOPHILS NFR BLD AUTO: 0 % (ref 0–1)
BILIRUB SERPL-MCNC: 0.29 MG/DL (ref 0.2–1)
BUN SERPL-MCNC: 7 MG/DL (ref 5–25)
BUN SERPL-MCNC: 8 MG/DL (ref 5–25)
CALCIUM SERPL-MCNC: 7.9 MG/DL (ref 8.3–10.1)
CALCIUM SERPL-MCNC: 8.3 MG/DL (ref 8.3–10.1)
CHLORIDE SERPL-SCNC: 102 MMOL/L (ref 100–108)
CHLORIDE SERPL-SCNC: 107 MMOL/L (ref 100–108)
CO2 SERPL-SCNC: 29 MMOL/L (ref 21–32)
CO2 SERPL-SCNC: 34 MMOL/L (ref 21–32)
CREAT SERPL-MCNC: 0.49 MG/DL (ref 0.6–1.3)
CREAT SERPL-MCNC: 0.67 MG/DL (ref 0.6–1.3)
EOSINOPHIL # BLD AUTO: 0.01 THOUSAND/ΜL (ref 0–0.61)
EOSINOPHIL NFR BLD AUTO: 0 % (ref 0–6)
ERYTHROCYTE [DISTWIDTH] IN BLOOD BY AUTOMATED COUNT: 12.1 % (ref 11.6–15.1)
GFR SERPL CREATININE-BSD FRML MDRD: 107 ML/MIN/1.73SQ M
GFR SERPL CREATININE-BSD FRML MDRD: 97 ML/MIN/1.73SQ M
GLUCOSE SERPL-MCNC: 110 MG/DL (ref 65–140)
GLUCOSE SERPL-MCNC: 115 MG/DL (ref 65–140)
HCT VFR BLD AUTO: 34.9 % (ref 34.8–46.1)
HGB BLD-MCNC: 11.8 G/DL (ref 11.5–15.4)
IMM GRANULOCYTES # BLD AUTO: 0.04 THOUSAND/UL (ref 0–0.2)
IMM GRANULOCYTES NFR BLD AUTO: 0 % (ref 0–2)
LYMPHOCYTES # BLD AUTO: 1.05 THOUSANDS/ΜL (ref 0.6–4.47)
LYMPHOCYTES NFR BLD AUTO: 11 % (ref 14–44)
MCH RBC QN AUTO: 31.4 PG (ref 26.8–34.3)
MCHC RBC AUTO-ENTMCNC: 33.8 G/DL (ref 31.4–37.4)
MCV RBC AUTO: 93 FL (ref 82–98)
MONOCYTES # BLD AUTO: 0.77 THOUSAND/ΜL (ref 0.17–1.22)
MONOCYTES NFR BLD AUTO: 8 % (ref 4–12)
NEUTROPHILS # BLD AUTO: 7.65 THOUSANDS/ΜL (ref 1.85–7.62)
NEUTS SEG NFR BLD AUTO: 81 % (ref 43–75)
NRBC BLD AUTO-RTO: 0 /100 WBCS
PLATELET # BLD AUTO: 274 THOUSANDS/UL (ref 149–390)
PMV BLD AUTO: 10.1 FL (ref 8.9–12.7)
POTASSIUM SERPL-SCNC: 2.8 MMOL/L (ref 3.5–5.3)
POTASSIUM SERPL-SCNC: 3 MMOL/L (ref 3.5–5.3)
PROT SERPL-MCNC: 5.4 G/DL (ref 6.4–8.2)
RBC # BLD AUTO: 3.76 MILLION/UL (ref 3.81–5.12)
SODIUM SERPL-SCNC: 140 MMOL/L (ref 136–145)
SODIUM SERPL-SCNC: 142 MMOL/L (ref 136–145)
WBC # BLD AUTO: 9.54 THOUSAND/UL (ref 4.31–10.16)

## 2020-02-26 PROCEDURE — 99232 SBSQ HOSP IP/OBS MODERATE 35: CPT | Performed by: INTERNAL MEDICINE

## 2020-02-26 PROCEDURE — 85025 COMPLETE CBC W/AUTO DIFF WBC: CPT | Performed by: INTERNAL MEDICINE

## 2020-02-26 PROCEDURE — 80048 BASIC METABOLIC PNL TOTAL CA: CPT | Performed by: INTERNAL MEDICINE

## 2020-02-26 PROCEDURE — 99238 HOSP IP/OBS DSCHRG MGMT 30/<: CPT | Performed by: INTERNAL MEDICINE

## 2020-02-26 PROCEDURE — C9113 INJ PANTOPRAZOLE SODIUM, VIA: HCPCS | Performed by: FAMILY MEDICINE

## 2020-02-26 PROCEDURE — 80053 COMPREHEN METABOLIC PANEL: CPT | Performed by: INTERNAL MEDICINE

## 2020-02-26 RX ORDER — POTASSIUM CHLORIDE 20 MEQ/1
40 TABLET, EXTENDED RELEASE ORAL ONCE
Status: COMPLETED | OUTPATIENT
Start: 2020-02-26 | End: 2020-02-26

## 2020-02-26 RX ORDER — FUROSEMIDE 10 MG/ML
20 INJECTION INTRAMUSCULAR; INTRAVENOUS ONCE
Status: COMPLETED | OUTPATIENT
Start: 2020-02-26 | End: 2020-02-26

## 2020-02-26 RX ORDER — POTASSIUM CHLORIDE 14.9 MG/ML
20 INJECTION INTRAVENOUS
Status: COMPLETED | OUTPATIENT
Start: 2020-02-26 | End: 2020-02-27

## 2020-02-26 RX ADMIN — PIPERACILLIN AND TAZOBACTAM 3.38 G: 3; .375 INJECTION, POWDER, LYOPHILIZED, FOR SOLUTION INTRAVENOUS at 05:10

## 2020-02-26 RX ADMIN — HYDROMORPHONE HYDROCHLORIDE 1 MG: 2 INJECTION INTRAMUSCULAR; INTRAVENOUS; SUBCUTANEOUS at 06:38

## 2020-02-26 RX ADMIN — ONDANSETRON 4 MG: 2 INJECTION INTRAMUSCULAR; INTRAVENOUS at 06:38

## 2020-02-26 RX ADMIN — HYDROMORPHONE HYDROCHLORIDE 1 MG: 2 INJECTION INTRAMUSCULAR; INTRAVENOUS; SUBCUTANEOUS at 11:49

## 2020-02-26 RX ADMIN — METRONIDAZOLE 500 MG: 500 INJECTION, SOLUTION INTRAVENOUS at 11:38

## 2020-02-26 RX ADMIN — FUROSEMIDE 20 MG: 10 INJECTION, SOLUTION INTRAMUSCULAR; INTRAVENOUS at 12:41

## 2020-02-26 RX ADMIN — ENOXAPARIN SODIUM 40 MG: 40 INJECTION SUBCUTANEOUS at 08:16

## 2020-02-26 RX ADMIN — PANTOPRAZOLE SODIUM 40 MG: 40 INJECTION, POWDER, FOR SOLUTION INTRAVENOUS at 08:17

## 2020-02-26 RX ADMIN — POTASSIUM CHLORIDE 20 MEQ: 200 INJECTION, SOLUTION INTRAVENOUS at 12:41

## 2020-02-26 RX ADMIN — PIPERACILLIN AND TAZOBACTAM 3.38 G: 3; .375 INJECTION, POWDER, LYOPHILIZED, FOR SOLUTION INTRAVENOUS at 11:37

## 2020-02-26 RX ADMIN — OXYCODONE HYDROCHLORIDE 10 MG: 10 TABLET ORAL at 08:17

## 2020-02-26 RX ADMIN — HYDROMORPHONE HYDROCHLORIDE 1 MG: 2 INJECTION INTRAMUSCULAR; INTRAVENOUS; SUBCUTANEOUS at 21:04

## 2020-02-26 RX ADMIN — HYDROMORPHONE HYDROCHLORIDE 1 MG: 2 INJECTION INTRAMUSCULAR; INTRAVENOUS; SUBCUTANEOUS at 16:02

## 2020-02-26 RX ADMIN — SODIUM CHLORIDE 125 ML/HR: 0.9 INJECTION, SOLUTION INTRAVENOUS at 07:20

## 2020-02-26 RX ADMIN — HYDROMORPHONE HYDROCHLORIDE 1 MG: 2 INJECTION INTRAMUSCULAR; INTRAVENOUS; SUBCUTANEOUS at 01:43

## 2020-02-26 RX ADMIN — METRONIDAZOLE 500 MG: 500 INJECTION, SOLUTION INTRAVENOUS at 04:00

## 2020-02-26 RX ADMIN — PIPERACILLIN AND TAZOBACTAM 3.38 G: 3; .375 INJECTION, POWDER, LYOPHILIZED, FOR SOLUTION INTRAVENOUS at 17:33

## 2020-02-26 RX ADMIN — POTASSIUM CHLORIDE 20 MEQ: 200 INJECTION, SOLUTION INTRAVENOUS at 09:03

## 2020-02-26 RX ADMIN — POTASSIUM CHLORIDE 40 MEQ: 1500 TABLET, EXTENDED RELEASE ORAL at 09:03

## 2020-02-26 RX ADMIN — POTASSIUM CHLORIDE 40 MEQ: 1500 TABLET, EXTENDED RELEASE ORAL at 19:44

## 2020-02-26 RX ADMIN — PIPERACILLIN AND TAZOBACTAM 3.38 G: 3; .375 INJECTION, POWDER, LYOPHILIZED, FOR SOLUTION INTRAVENOUS at 22:51

## 2020-02-26 RX ADMIN — METRONIDAZOLE 500 MG: 500 INJECTION, SOLUTION INTRAVENOUS at 18:27

## 2020-02-26 NOTE — UTILIZATION REVIEW
Continued Stay Review    Date: 2/26                        Current Patient Class: inpatient  Current Level of Care: Med/surg    HPI:60 y o  female initially admitted on 2/22  Assessment/Plan:   2/26:  Minimal tenderness left lower abdomen  Mild bilateral lower extremity edema  DC IV fluids  Tolerating soft diet  Continue iv abx and serial abdominal exams  Had a liquid bm last night  Feels bloated  2/25 Update: Tolerating clear liquids  Leukocytosis resolved  contineu IV fluids and iv abx  Advance diet and monitor  2/24 Update:  Diet advanced to clears  Continue with IV fluids and IV abx  Continues with abdominal pain and distention  2/23 gen surg consult:  Make NPO  Continue serial abdominal and CBC exams  Continues to complain of abdominal pain and nausea  Continue with IV fluids and IV abx  Per CT, worsening fat stranding in the area of diverticulitis with a few small foci of air indicating microperforation  Pertinent Labs/Diagnostic Results:   2/23 CT A/P:  Worsening fat stranding in the region of diverticulitis with a few small foci of air indicating microperforation   Recommend colonoscopy upon resolution of acute syndrome to ensure there is no underlying lesion  No abscess visualized  nterval development of dilated loops of small bowel favor ileus over obstruction  Worsening mural thickening of the bladder likely reactive   Recommend correlation with urinalysis and urine culture  Small volume ascites    Results from last 7 days   Lab Units 02/26/20  0436 02/25/20  0737 02/24/20 0442 02/23/20 0442 02/22/20 0448   WBC Thousand/uL 9 54 9 36 13 46* 15 17* 16 94*   HEMOGLOBIN g/dL 11 8 11 7 12 7 12 4 13 6   HEMATOCRIT % 34 9 35 6 38 2 37 6 40 5   PLATELETS Thousands/uL 274 260 259 211 229   NEUTROS ABS Thousands/µL 7 65* 7 90* 11 94* 13 69*  --    BANDS PCT %  --   --   --   --  10*         Results from last 7 days   Lab Units 02/26/20  0436 02/25/20  2400 02/24/20 0442 02/23/20 0442 02/22/20  0448   SODIUM mmol/L 142 141 142 139 138   POTASSIUM mmol/L 2 8* 3 0* 3 7 3 2* 3 2*   CHLORIDE mmol/L 107 108 107 105 104   CO2 mmol/L 29 29 27 26 26   ANION GAP mmol/L 6 4 8 8 8   BUN mg/dL 8 8 11 8 10   CREATININE mg/dL 0 49* 0 63 0 55* 0 61 0 76   EGFR ml/min/1 73sq m 107 98 103 100 86   CALCIUM mg/dL 7 9* 7 7* 8 1* 7 9* 7 8*     Results from last 7 days   Lab Units 02/26/20  0436 02/25/20  0737 02/24/20  0442 02/22/20  0448 02/21/20  0932   AST U/L 14 10 12 11 13   ALT U/L 14 16 13 15 20   ALK PHOS U/L 32* 35* 47 41* 53   TOTAL PROTEIN g/dL 5 4* 5 5* 6 0* 6 2* 7 5   ALBUMIN g/dL 2 3* 2 3* 2 5* 3 0* 4 0   TOTAL BILIRUBIN mg/dL 0 29 0 30 0 42 0 98 0 36         Results from last 7 days   Lab Units 02/26/20  0436 02/25/20  0737 02/24/20  0442 02/23/20  0442 02/22/20  0448 02/21/20  0932   GLUCOSE RANDOM mg/dL 110 116 99 118 121 107     Results from last 7 days   Lab Units 02/21/20  0932   PROTIME seconds 12 2   INR  0 91       Results from last 7 days   Lab Units 02/21/20  0932   LACTIC ACID mmol/L 1 9     Results from last 7 days   Lab Units 02/21/20  1356 02/21/20  0932   LIPASE u/L  --  159   AMYLASE IU/L 36  --        Results from last 7 days   Lab Units 02/21/20  1039   CLARITY UA  Clear   COLOR UA  Yellow   SPEC GRAV UA  1 010   PH UA  6 0   GLUCOSE UA mg/dl Negative   KETONES UA mg/dl Negative   BLOOD UA  Trace-lysed*   PROTEIN UA mg/dl Negative   NITRITE UA  Negative   BILIRUBIN UA  Negative   UROBILINOGEN UA E U /dl 0 2   LEUKOCYTES UA  Negative   WBC UA /hpf None Seen   RBC UA /hpf None Seen   BACTERIA UA /hpf None Seen   EPITHELIAL CELLS WET PREP /hpf None Seen     Results from last 7 days   Lab Units 02/22/20  0448   TOTAL COUNTED  100     Vital Signs:   Date/Time Temp Pulse Resp BP MAP (mmHg) SpO2 O2 Device   02/26/20 15:01:38 99 1 °F (37 3 °C) 66 18 120/67 85 90 %    02/26/20 07:42:12 98 3 °F (36 8 °C) 69 16 119/62 81 94 % Nasal cannula   02/25/20 23:20:44 98 4 °F (36 9 °C) 67 18 119/61 80 89 %Abnormal     02/25/20 2100       Nasal cannula   02/25/20 15:11:46 98 4 °F (36 9 °C) 64 18 117/58 78 90 % Nasal cannula   02/25/20 08:48:52 98 3 °F (36 8 °C) 64 18 130/74 93 93 % Nasal cannula   02/25/20 00:33:21 98 1 °F (36 7 °C) 73 16 111/67 82 89 %Abnormal     02/24/20 1942       Nasal cannula   02/24/20 15:02:34 98 4 °F (36 9 °C) 74 17 116/71 86 93 %    02/24/20 0900       Nasal cannula   02/24/20 07:30:50 98 3 °F (36 8 °C) 69 18 115/73          Medications:   Scheduled Medications:    Medications:  enoxaparin 40 mg Subcutaneous Daily   piperacillin-tazobactam 3 375 g Intravenous Q6H   And      metroNIDAZOLE 500 mg Intravenous Q8H   oxyCODONE 10 mg Oral Daily   pantoprazole 40 mg Intravenous Q24H [address]     Continuous IV Infusions:     PRN Meds:    acetaminophen 650 mg Oral Q6H PRN   HYDROmorphone 1 mg Intravenous Q4H PRN   ondansetron 4 mg Intravenous Q6H PRN       Discharge Plan: TBD    Network Utilization Review Department  [email or web address]  ATTENTION: Please call with any questions or concerns to [phone] and carefully listen to the prompts so that you are directed to the right person  All voicemails are confidential   Rosalba Andover all requests for admission clinical reviews, approved or denied determinations and any other requests to dedicated fax number below belonging to the campus where the patient is receiving treatment   List of dedicated fax numbers for the Facilities:  FACILITY NAME UR FAX NUMBER   ADMISSION DENIALS (Administrative/Medical Necessity) [phone]   [address] (Maternity/NICU/Pediatrics) [phone]   Dionicio Morfin [phone]     [address] [phone]   Nick Mirza [phone]   [facility] [place] [address] [facility] [address] [place] [address] [place] [phone]   [place] [address] [address] [phone]

## 2020-02-26 NOTE — DISCHARGE SUMMARY
Discharge- Lam Jolly 1960, 62 y o  female MRN: [id number]    Unit/Bed#: -01 Encounter: [id number]    Primary Care Provider: Eladio Wilde MD   Date and time admitted to hospital: 2/21/2020  9:12 AM        * Sigmoid diverticulitis  Assessment & Plan    Sia Kehr diet as tolerated  Transition from IV antibiotics to oral for a one-week course  Antibiotics to be continued through February 28th, 2020      Calculus of gallbladder without cholecystitis without obstruction  Assessment & Plan  Asymptomatic  Surgery consult appreciated  Continue conservative management    Idiopathic hypotension  Assessment & Plan  Blood pressure improved  Patient is on IV fluid-continue    Spinal stenosis  Assessment & Plan  As per patient, her whole spine is affected  Patient has titanium rods in cervical spine- NO MRI  Continue pain management    Bandemia  Assessment & Plan  No fever noted  WBCs trending down  White count stable currently  Resolved Problems  Date Reviewed: 2/21/2020    None          Admission Date:   Admission Orders (From admission, onward)     Ordered        02/21/20 1131  Inpatient Admission (expected length of stay for this patient Order details is greater than two midnights)  Once                     Admitting Diagnosis: Diverticulitis [K57 92]  Abdominal pain [R10 9]  Leukocytosis, unspecified type [D72 829]  Left lower quadrant abdominal pain [R10 32]    Procedures Performed: No orders of the defined types were placed in this encounter  Summary of Hospital Course: This is a very pleasant 80-year-old female who presents hospital with abdominal pain  The patient presents the morning of admission with increasing abdominal discomfort in left lower quadrant  She described as nonradiating and 10 out 10 in pain  She was admitted with admitting diagnosis acute diverticulitis  · Acute diverticulitis-continue with antibiotic therapy    Patient will receive one-week course of antibiotics  Antibiotics through the 28th  · Leukocytosis 2nd to above  Resolved  · Spinal stenosis by history-no MRI available  · Lower extremity swelling-likely secondary to IV fluids  Dose of Lasix given this morning  Patient no acute distress  Tolerating oral diet  Change to oral antibiotic therapy  Condition at Discharge: fair         Discharge instructions/Information to patient and family:   See after visit summary for information provided to patient and family  Provisions for Follow-Up Care:  See after visit summary for information related to follow-up care and any pertinent home health orders  PCP: Veronica Brandt MD    Disposition: Home    Planned Readmission: No    Discharge Statement   I spent 35 minutes discharging the patient  This time was spent on the day of discharge  I had direct contact with the patient on the day of discharge  Additional documentation is required if more than 30 minutes were spent on discharge  Discharge Medications:  See after visit summary for reconciled discharge medications provided to patient and family

## 2020-02-26 NOTE — ASSESSMENT & PLAN NOTE
[place] diet as tolerated  Transition from IV antibiotics to oral for a one-week course    Antibiotics to be continued through February 28th, 2020

## 2020-02-26 NOTE — PROGRESS NOTES
Progress Note - Vasquez Torres 1960, 62 y o  female MRN: [id number]    Unit/Bed#: -Yu Encounter: [id number]    Primary Care Provider: rTudi Rowley MD   Date and time admitted to hospital: 2020  9:12 AM        * Sigmoid diverticulitis  Assessment & Plan    Marcel Console diet as tolerated  Transition from IV antibiotics to oral for a one-week course  Antibiotics to be continued through 2020      Calculus of gallbladder without cholecystitis without obstruction  Assessment & Plan  Asymptomatic  Surgery consult appreciated  Continue conservative management    Idiopathic hypotension  Assessment & Plan  Blood pressure improved  Patient is on IV fluid-continue    Spinal stenosis  Assessment & Plan  As per patient, her whole spine is affected  Patient has titanium rods in cervical spine- NO MRI  Continue pain management    Bandemia  Assessment & Plan  No fever noted  WBCs trending down  White count stable currently  VTE Pharmacologic Prophylaxis:   Pharmacologic: Enoxaparin (Lovenox)  Mechanical VTE Prophylaxis in Place: No    Patient Centered Rounds: I have performed bedside rounds with nursing staff today  Time Spent for Care: 15 minutes  More than 50% of total time spent on counseling and coordination of care as described above  Current Length of Stay: 5 day(s)    Current Patient Status: Inpatient   Certification Statement: The patient will continue to require additional inpatient hospital stay due to Need to monitor symptom        Code Status: Level 1 - Full Code      Subjective:   Patient comfortable with diet however reporting increasing swelling in her legs  IV fluids stopped  Patient tolerating oral intake      Objective:     Vitals:   Temp (24hrs), Av 4 °F (36 9 °C), Min:98 3 °F (36 8 °C), Max:98 4 °F (36 9 °C)    Temp:  [98 3 °F (36 8 °C)-98 4 °F (36 9 °C)] 98 3 °F (36 8 °C)  HR:  [64-69] 69  Resp:  [16-18] 16  BP: (117-119)/(58-62) 119/62  SpO2:  [89 %-94 %] 94 %  Body mass index is 27 97 kg/m²  Input and Output Summary (last 24 hours): Intake/Output Summary (Last 24 hours) at 2/26/2020 1137  Last data filed at 2/26/2020 0903  Gross per 24 hour   Intake 1677 92 ml   Output    Net 1677 92 ml       Physical Exam:     Physical Exam   HENT:   Head: Normocephalic and atraumatic  Neck: Normal range of motion  Neck supple  Cardiovascular: Normal rate and regular rhythm  Pulmonary/Chest: Effort normal  No stridor  She has no wheezes  Abdominal: She exhibits no distension  There is no tenderness  Musculoskeletal: She exhibits edema  Skin: Skin is warm and dry  Additional Data:     Labs:    Results from last 7 days   Lab Units 02/26/20  0436   WBC Thousand/uL 9 54   HEMOGLOBIN g/dL 11 8   HEMATOCRIT % 34 9   PLATELETS Thousands/uL 274   NEUTROS PCT % 81*   LYMPHS PCT % 11*   MONOS PCT % 8   EOS PCT % 0     Results from last 7 days   Lab Units 02/26/20  0436   POTASSIUM mmol/L 2 8*   CHLORIDE mmol/L 107   CO2 mmol/L 29   BUN mg/dL 8   CREATININE mg/dL 0 49*   CALCIUM mg/dL 7 9*   ALK PHOS U/L 32*   ALT U/L 14   AST U/L 14     Results from last 7 days   Lab Units 02/21/20  0932   INR  0 91       * I Have Reviewed All Lab Data Listed Above  * Additional Pertinent Lab Tests Reviewed:  All Labs Within Last 24 Hours Reviewed      Recent Cultures (last 7 days):           Last 24 Hours Medication List:     Current Facility-Administered Medications:  acetaminophen 650 mg Oral Q6H PRN Kaleb Wise MD    enoxaparin 40 mg Subcutaneous Daily Alysia Wise MD    HYDROmorphone 1 mg Intravenous Q4H PRN Milagro Han MD    piperacillin-tazobactam 3 375 g Intravenous Q6H Kaleb Wise MD Last Rate: 3 375 g (02/26/20 0510)   And        metroNIDAZOLE 500 mg Intravenous Q8H Alysia Wise MD Last Rate: 500 mg (02/26/20 0400)   ondansetron 4 mg Intravenous Q6H PRN Milagro Han MD    oxyCODONE 10 mg Oral Daily Milagro Han MD    pantoprazole 40 mg Intravenous Q24H [address] Alysia Wise MD    potassium chloride 20 mEq Intravenous Q2H Sourav Decker DO Last Rate: 20 mEq (02/26/20 0903)        Today, Patient Was Seen By: Ochoa Kim DO    ** Please Note: Dictation voice to text software may have been used in the creation of this document   **

## 2020-02-26 NOTE — PROGRESS NOTES
Progress Note - General Surgery   Aysha Escobedo 62 y o  female MRN: [id number]  Unit/Bed#: -Yu Encounter: [id number]    Assessment:  - Sigmoid colon diverticulitis  - Pain continuing to improve  - Mild edema both legs  - Leukocytosis resolved, WBC 9 54  - Tolerating soft diet well  - 2 bowel movements in last 24 hours    Plan:  - Continue surgical soft diet and monitor  - Discontinue IV saline, keep IV line in place  - Ambulate as tolerated  - Continue current IV antibiotics, medications  - Serial exams and CBC  - Consider discharge tomorrow if continues to improve  Subjective/Objective   Subjective: PT states she is feeling good today  Pain continues to improve and is well controlled with pain medication  Pt still feels bloated  She says he legs are swollen and wants to walk today  Pt had one liquid bowel movement last night with a lot of flatus and one bowel movement this morning she states was more solid  Appetite is good today  Says she is enjoying her current diet and not having any difficulties with it  Denies pain, N/V/D/C, blood in stool, urinary difficulty, fever, chills, CP, SOB  Objective:     Blood pressure 119/62, pulse 69, temperature 98 3 °F (36 8 °C), resp  rate 16, height 5' 6" (1 676 m), weight 74 6 kg (164 lb 7 4 oz), SpO2 94 %  ,Body mass index is 26 55 kg/m²  Intake/Output Summary (Last 24 hours) at 2/26/2020 0750  Last data filed at 2/26/2020 0720  Gross per 24 hour   Intake 1815 ml   Output    Net 1815 ml       Invasive Devices     Peripheral Intravenous Line            Peripheral IV 02/25/20 Dorsal (posterior); Right Hand 1 day              Scheduled Meds:  Current Facility-Administered Medications:  acetaminophen 650 mg Oral Q6H PRN Mauricio Wise MD    enoxaparin 40 mg Subcutaneous Daily Alysia Wise MD    HYDROmorphone 1 mg Intravenous Q4H PRN Alysia Wise MD    piperacillin-tazobactam 3 375 g Intravenous Q6H Alysia Wise MD Last Rate: 3 375 g (02/26/20 0510)   And        metroNIDAZOLE 500 mg Intravenous Q8H Alysia Wise MD Last Rate: 500 mg (02/26/20 0400)   ondansetron 4 mg Intravenous Q6H PRN Chris Scott MD    oxyCODONE 10 mg Oral Daily Alysia Wise MD    pantoprazole 40 mg Intravenous Q24H [address] Alysia Wise MD    sodium chloride 125 mL/hr Intravenous Continuous Chris Scott MD Last Rate: 125 mL/hr (02/26/20 0720)     Continuous Infusions:  sodium chloride 125 mL/hr Last Rate: 125 mL/hr (02/26/20 0720)     PRN Meds:   acetaminophen    HYDROmorphone    ondansetron    Physical Exam: General appearance: alert and oriented, in no acute distress  Lungs: clear to auscultation bilaterally  Heart: regular rate and rhythm, S1, S2 normal, no murmur, click, rub or gallop  Abdomen: normal findings: bowel sounds normal, no masses palpable, no organomegaly and soft, non-tender and abnormal findings:  distended  Extremities: mild nonpitting edema of both legs  Skin: Skin color, texture, turgor normal  No rashes or lesions    Lab, Imaging and other studies:  I have personally reviewed pertinent lab results     CBC:   Lab Results   Component Value Date    WBC 9 54 02/26/2020    HGB 11 8 02/26/2020    HCT 34 9 02/26/2020    MCV 93 02/26/2020     02/26/2020    MCH 31 4 02/26/2020    MCHC 33 8 02/26/2020    RDW 12 1 02/26/2020    MPV 10 1 02/26/2020    NRBC 0 02/26/2020   CMP:   Lab Results   Component Value Date    SODIUM 142 02/26/2020    K 2 8 (L) 02/26/2020     02/26/2020    CO2 29 02/26/2020    BUN 8 02/26/2020    CREATININE 0 49 (L) 02/26/2020    CALCIUM 7 9 (L) 02/26/2020    AST 14 02/26/2020    ALT 14 02/26/2020    ALKPHOS 32 (L) 02/26/2020    EGFR 107 02/26/2020     VTE Pharmacologic Prophylaxis: Enoxaparin (Lovenox)  VTE Mechanical Prophylaxis: sequential compression device     Rustam Pacheco PA-C

## 2020-02-26 NOTE — ASSESSMENT & PLAN NOTE
As per patient, her whole spine is affected  Patient has titanium rods in cervical spine- NO MRI  Continue pain management Manual Repair Warning Statement: We plan on removing the manually selected variable below in favor of our much easier automatic structured text blocks found in the previous tab. We decided to do this to help make the flow better and give you the full power of structured data. Manual selection is never going to be ideal in our platform and I would encourage you to avoid using manual selection from this point on, especially since I will be sunsetting this feature. It is important that you do one of two things with the customized text below. First, you can save all of the text in a word file so you can have it for future reference. Second, transfer the text to the appropriate area in the Library tab. Lastly, if there is a flap or graft type which we do not have you need to let us know right away so I can add it in before the variable is hidden. No need to panic, we plan to give you roughly 6 months to make the change.

## 2020-02-27 VITALS
SYSTOLIC BLOOD PRESSURE: 120 MMHG | BODY MASS INDEX: 27.85 KG/M2 | WEIGHT: 173.28 LBS | HEIGHT: 66 IN | TEMPERATURE: 98.6 F | RESPIRATION RATE: 16 BRPM | HEART RATE: 82 BPM | DIASTOLIC BLOOD PRESSURE: 64 MMHG | OXYGEN SATURATION: 93 %

## 2020-02-27 LAB
ALBUMIN SERPL BCP-MCNC: 2.3 G/DL (ref 3.5–5)
ALP SERPL-CCNC: 31 U/L (ref 46–116)
ALT SERPL W P-5'-P-CCNC: 20 U/L (ref 12–78)
ANION GAP SERPL CALCULATED.3IONS-SCNC: 3 MMOL/L (ref 4–13)
AST SERPL W P-5'-P-CCNC: 24 U/L (ref 5–45)
BASOPHILS # BLD AUTO: 0.02 THOUSANDS/ΜL (ref 0–0.1)
BASOPHILS NFR BLD AUTO: 0 % (ref 0–1)
BILIRUB SERPL-MCNC: 0.31 MG/DL (ref 0.2–1)
BUN SERPL-MCNC: 7 MG/DL (ref 5–25)
CALCIUM SERPL-MCNC: 8 MG/DL (ref 8.3–10.1)
CHLORIDE SERPL-SCNC: 104 MMOL/L (ref 100–108)
CO2 SERPL-SCNC: 32 MMOL/L (ref 21–32)
CREAT SERPL-MCNC: 0.56 MG/DL (ref 0.6–1.3)
EOSINOPHIL # BLD AUTO: 0.02 THOUSAND/ΜL (ref 0–0.61)
EOSINOPHIL NFR BLD AUTO: 0 % (ref 0–6)
ERYTHROCYTE [DISTWIDTH] IN BLOOD BY AUTOMATED COUNT: 11.9 % (ref 11.6–15.1)
GFR SERPL CREATININE-BSD FRML MDRD: 102 ML/MIN/1.73SQ M
GLUCOSE SERPL-MCNC: 107 MG/DL (ref 65–140)
HCT VFR BLD AUTO: 34.8 % (ref 34.8–46.1)
HGB BLD-MCNC: 11.9 G/DL (ref 11.5–15.4)
IMM GRANULOCYTES # BLD AUTO: 0.07 THOUSAND/UL (ref 0–0.2)
IMM GRANULOCYTES NFR BLD AUTO: 1 % (ref 0–2)
LYMPHOCYTES # BLD AUTO: 1.3 THOUSANDS/ΜL (ref 0.6–4.47)
LYMPHOCYTES NFR BLD AUTO: 16 % (ref 14–44)
MCH RBC QN AUTO: 31.2 PG (ref 26.8–34.3)
MCHC RBC AUTO-ENTMCNC: 34.2 G/DL (ref 31.4–37.4)
MCV RBC AUTO: 91 FL (ref 82–98)
MONOCYTES # BLD AUTO: 0.72 THOUSAND/ΜL (ref 0.17–1.22)
MONOCYTES NFR BLD AUTO: 9 % (ref 4–12)
NEUTROPHILS # BLD AUTO: 6.23 THOUSANDS/ΜL (ref 1.85–7.62)
NEUTS SEG NFR BLD AUTO: 74 % (ref 43–75)
NRBC BLD AUTO-RTO: 0 /100 WBCS
PLATELET # BLD AUTO: 272 THOUSANDS/UL (ref 149–390)
PMV BLD AUTO: 9.4 FL (ref 8.9–12.7)
POTASSIUM SERPL-SCNC: 3.2 MMOL/L (ref 3.5–5.3)
PROT SERPL-MCNC: 5.2 G/DL (ref 6.4–8.2)
RBC # BLD AUTO: 3.81 MILLION/UL (ref 3.81–5.12)
SODIUM SERPL-SCNC: 139 MMOL/L (ref 136–145)
WBC # BLD AUTO: 8.36 THOUSAND/UL (ref 4.31–10.16)

## 2020-02-27 PROCEDURE — 80053 COMPREHEN METABOLIC PANEL: CPT | Performed by: PHYSICIAN ASSISTANT

## 2020-02-27 PROCEDURE — 99239 HOSP IP/OBS DSCHRG MGMT >30: CPT | Performed by: INTERNAL MEDICINE

## 2020-02-27 PROCEDURE — C9113 INJ PANTOPRAZOLE SODIUM, VIA: HCPCS | Performed by: FAMILY MEDICINE

## 2020-02-27 PROCEDURE — 85025 COMPLETE CBC W/AUTO DIFF WBC: CPT | Performed by: PHYSICIAN ASSISTANT

## 2020-02-27 RX ORDER — CIPROFLOXACIN 500 MG/1
500 TABLET, FILM COATED ORAL EVERY 12 HOURS SCHEDULED
Status: DISCONTINUED | OUTPATIENT
Start: 2020-02-27 | End: 2020-02-27 | Stop reason: HOSPADM

## 2020-02-27 RX ORDER — POTASSIUM CHLORIDE 20 MEQ/1
40 TABLET, EXTENDED RELEASE ORAL 2 TIMES DAILY
Status: COMPLETED | OUTPATIENT
Start: 2020-02-27 | End: 2020-02-27

## 2020-02-27 RX ORDER — PANTOPRAZOLE SODIUM 40 MG/1
40 TABLET, DELAYED RELEASE ORAL
Status: DISCONTINUED | OUTPATIENT
Start: 2020-02-28 | End: 2020-02-27 | Stop reason: HOSPADM

## 2020-02-27 RX ORDER — METRONIDAZOLE 500 MG/1
500 TABLET ORAL EVERY 8 HOURS SCHEDULED
Status: DISCONTINUED | OUTPATIENT
Start: 2020-02-27 | End: 2020-02-27 | Stop reason: HOSPADM

## 2020-02-27 RX ORDER — METRONIDAZOLE 500 MG/1
500 TABLET ORAL EVERY 8 HOURS SCHEDULED
Qty: 11 TABLET | Refills: 0 | Status: SHIPPED | OUTPATIENT
Start: 2020-02-27 | End: 2020-03-02

## 2020-02-27 RX ORDER — FUROSEMIDE 10 MG/ML
40 INJECTION INTRAMUSCULAR; INTRAVENOUS ONCE
Status: COMPLETED | OUTPATIENT
Start: 2020-02-27 | End: 2020-02-27

## 2020-02-27 RX ORDER — CIPROFLOXACIN 500 MG/1
500 TABLET, FILM COATED ORAL EVERY 12 HOURS SCHEDULED
Qty: 7 TABLET | Refills: 0 | Status: SHIPPED | OUTPATIENT
Start: 2020-02-27 | End: 2020-03-02

## 2020-02-27 RX ORDER — PANTOPRAZOLE SODIUM 40 MG/1
40 TABLET, DELAYED RELEASE ORAL DAILY
Qty: 30 TABLET | Refills: 0 | Status: SHIPPED | OUTPATIENT
Start: 2020-02-27 | End: 2020-07-17

## 2020-02-27 RX ADMIN — FUROSEMIDE 40 MG: 10 INJECTION, SOLUTION INTRAMUSCULAR; INTRAVENOUS at 10:02

## 2020-02-27 RX ADMIN — METRONIDAZOLE 500 MG: 500 TABLET ORAL at 08:31

## 2020-02-27 RX ADMIN — METRONIDAZOLE 500 MG: 500 INJECTION, SOLUTION INTRAVENOUS at 03:14

## 2020-02-27 RX ADMIN — METRONIDAZOLE 500 MG: 500 TABLET ORAL at 14:26

## 2020-02-27 RX ADMIN — PANTOPRAZOLE SODIUM 40 MG: 40 INJECTION, POWDER, FOR SOLUTION INTRAVENOUS at 08:32

## 2020-02-27 RX ADMIN — ENOXAPARIN SODIUM 40 MG: 40 INJECTION SUBCUTANEOUS at 08:32

## 2020-02-27 RX ADMIN — POTASSIUM CHLORIDE 40 MEQ: 1500 TABLET, EXTENDED RELEASE ORAL at 18:06

## 2020-02-27 RX ADMIN — POTASSIUM CHLORIDE 40 MEQ: 1500 TABLET, EXTENDED RELEASE ORAL at 10:02

## 2020-02-27 RX ADMIN — HYDROMORPHONE HYDROCHLORIDE 1 MG: 2 INJECTION INTRAMUSCULAR; INTRAVENOUS; SUBCUTANEOUS at 06:06

## 2020-02-27 RX ADMIN — PIPERACILLIN AND TAZOBACTAM 3.38 G: 3; .375 INJECTION, POWDER, LYOPHILIZED, FOR SOLUTION INTRAVENOUS at 04:50

## 2020-02-27 RX ADMIN — OXYCODONE HYDROCHLORIDE 10 MG: 10 TABLET ORAL at 08:31

## 2020-02-27 RX ADMIN — HYDROMORPHONE HYDROCHLORIDE 1 MG: 2 INJECTION INTRAMUSCULAR; INTRAVENOUS; SUBCUTANEOUS at 12:35

## 2020-02-27 RX ADMIN — CIPROFLOXACIN HYDROCHLORIDE 500 MG: 500 TABLET, FILM COATED ORAL at 08:31

## 2020-02-27 NOTE — PROGRESS NOTES
Per General Surgery notes, patient no longer has nausea or vomiting and antibiotics can be changed to PO; Therefore after clinical evaluation the pantoprazole has / have been converted to Oral per [facility]-to[facility] Auto-Conversion Protocol for Adults as approved by the Pharmacy and Therapeutics Committee  The patient met all eligible criteria:  3 Age = 25years old   2) Received at least one dose of the IV form   3) Receiving at least one other scheduled oral/enteral medication   4) Tolerating an oral/enteral diet   and did not have any exclusions:   1) Critical care patient   2) Active GI bleed (IF assessing H2RAs or PPIs)   3) Continuous tube feeding (IF assessing cipro, doxycycline, levofloxacin, minocycline, rifampin, or voriconazole)   4) Receiving PO vancomycin (IF assessing metronidazole)   5) Persistent nausea and/or vomiting   6) Ileus or gastrointestinal obstruction   7) Canelo/nasogastric tube set for continuous suction   8) Specific order not to automatically convert to PO (in the order's comments or if discussed in the most recent Infectious Disease or primary team's progress notes)

## 2020-02-27 NOTE — PROGRESS NOTES
Progress Note - General Surgery   Charity Prom 62 y o  female MRN: [id number]  Unit/Bed#: -01 Encounter: [id number]    Assessment:  - Sigmoid colon diverticulitis  - WBC WNL 8 36    Plan:  - Convert to oral antibiotics, cipro and flagyl today  - General surgery may follow only as needed  - Continue current medical management    Subjective/Objective   Subjective: Pain doing okay today  Pain is well-controlled, about a 3/10 in severity  Patient's main concern is bloating and fluid she is retaining  Urinating frequently with lasix  Pt had 4 small bowel movements since last seen yesterday  Passing gas often  Swelling in legs  Reports good appetite and tolerating diet well  Denies N/V/D/C, urinary difficulty, fever, chills, CP, SOB  Objective:     Blood pressure 121/67, pulse 69, temperature 98 3 °F (36 8 °C), resp  rate 18, height 5' 6" (1 676 m), weight 78 6 kg (173 lb 4 5 oz), SpO2 90 %  ,Body mass index is 27 97 kg/m²  Intake/Output Summary (Last 24 hours) at 2/27/2020 0730  Last data filed at 2/26/2020 1756  Gross per 24 hour   Intake 802 92 ml   Output 1000 ml   Net -197 08 ml       Invasive Devices     Peripheral Intravenous Line            Peripheral IV 02/25/20 Dorsal (posterior); Right Hand 2 days                Physical Exam: General appearance: alert and oriented, in no acute distress  Lungs: clear to auscultation bilaterally  Heart: regular rate and rhythm, S1, S2 normal, no murmur, click, rub or gallop  Abdomen: normal findings: bowel sounds normal, no masses palpable and no organomegaly and abnormal findings:  distended and mild tenderness in the LLQ  Extremities: mild nonpitting edema of both legs    Lab, Imaging and other studies:  I have personally reviewed pertinent lab results      CBC:   Lab Results   Component Value Date    WBC 8 36 02/27/2020    HGB 11 9 02/27/2020    HCT 34 8 02/27/2020    MCV 91 02/27/2020     02/27/2020    MCH 31 2 02/27/2020    MCHC 34 2 02/27/2020    RDW 11 9 02/27/2020    MPV 9 4 02/27/2020    NRBC 0 02/27/2020   CMP:   Lab Results   Component Value Date    SODIUM 139 02/27/2020    K 3 2 (L) 02/27/2020     02/27/2020    CO2 32 02/27/2020    BUN 7 02/27/2020    CREATININE 0 56 (L) 02/27/2020    CALCIUM 8 0 (L) 02/27/2020    AST 24 02/27/2020    ALT 20 02/27/2020    ALKPHOS 31 (L) 02/27/2020    EGFR 102 02/27/2020     VTE Pharmacologic Prophylaxis: Enoxaparin (Lovenox)  VTE Mechanical Prophylaxis: sequential compression device     Rustam Pacheco PA-C

## 2020-02-27 NOTE — NURSING NOTE
Reviewed discharge instructions follow up appointment , med rec verbally understood, denies dizziness,lightheadness SOB, chest pain, received last dose  dilaudid iv at 12:35 pm

## 2020-02-27 NOTE — PLAN OF CARE
Problem: PAIN - ADULT  Goal: Verbalizes/displays adequate comfort level or baseline comfort level  Description  Interventions:  - Encourage patient to monitor pain and request assistance  - Assess pain using appropriate pain scale  - Administer analgesics based on type and severity of pain and evaluate response  - Implement non-pharmacological measures as appropriate and evaluate response  - Consider cultural and social influences on pain and pain management  - Notify physician/advanced practitioner if interventions unsuccessful or patient reports new pain  2/27/2020 1813 by Sury Lugo RN  Outcome: Adequate for Discharge  2/27/2020 1423 by Sury Lugo RN  Outcome: Adequate for Discharge     Problem: INFECTION - ADULT  Goal: Absence or prevention of progression during hospitalization  Description  INTERVENTIONS:  - Assess and monitor for signs and symptoms of infection  - Monitor lab/diagnostic results  - Monitor all insertion sites, i e  indwelling lines, tubes, and drains  - Monitor endotracheal if appropriate and nasal secretions for changes in amount and color  - [place] appropriate cooling/warming therapies per order  - Administer medications as ordered  - Instruct and encourage patient and family to use good hand hygiene technique  - Identify and instruct in appropriate isolation precautions for identified infection/condition  2/27/2020 1813 by Sury Lugo RN  Outcome: Adequate for Discharge  2/27/2020 1423 by Sury uLgo RN  Outcome: Adequate for Discharge  Goal: Absence of fever/infection during neutropenic period  Description  INTERVENTIONS:  - Monitor WBC    2/27/2020 1813 by Sury Lugo RN  Outcome: Adequate for Discharge  2/27/2020 1423 by Sury Lugo RN  Outcome: Adequate for Discharge     Problem: SAFETY ADULT  Goal: Patient will remain free of falls  Description  INTERVENTIONS:  - Assess patient frequently for physical needs  -  Identify cognitive and physical deficits and behaviors that affect risk of falls    -  [place] fall precautions as indicated by assessment   - Educate patient/family on patient safety including physical limitations  - Instruct patient to call for assistance with activity based on assessment  - Modify environment to reduce risk of injury  - Consider OT/PT consult to assist with strengthening/mobility  2/27/2020 1813 by Lulu Estrella RN  Outcome: Adequate for Discharge  2/27/2020 1423 by Lulu Estrella RN  Outcome: Adequate for Discharge  Goal: Maintain or return to baseline ADL function  Description  INTERVENTIONS:  -  Assess patient's ability to carry out ADLs; assess patient's baseline for ADL function and identify physical deficits which impact ability to perform ADLs (bathing, care of mouth/teeth, toileting, grooming, dressing, etc )  - Assess/evaluate cause of self-care deficits   - Assess range of motion  - Assess patient's mobility; develop plan if impaired  - Assess patient's need for assistive devices and provide as appropriate  - Encourage maximum independence but intervene and supervise when necessary  - Involve family in performance of ADLs  - Assess for home care needs following discharge   - Consider OT consult to assist with ADL evaluation and planning for discharge  - Provide patient education as appropriate  2/27/2020 1813 by Lulu Estrella RN  Outcome: Adequate for Discharge  2/27/2020 1423 by Lulu Estrella RN  Outcome: Adequate for Discharge  Goal: Maintain or return mobility status to optimal level  Description  INTERVENTIONS:  - Assess patient's baseline mobility status (ambulation, transfers, stairs, etc )    - Identify cognitive and physical deficits and behaviors that affect mobility  - Identify mobility aids required to assist with transfers and/or ambulation (gait belt, sit-to-stand, lift, walker, cane, etc )  - [place] fall precautions as indicated by assessment  - Record patient progress and toleration of activity level on Mobility SBAR; progress patient to next Phase/Stage  - Instruct patient to call for assistance with activity based on assessment  - Consider rehabilitation consult to assist with strengthening/weightbearing, etc   2/27/2020 1813 by Jenny Thompson RN  Outcome: Adequate for Discharge  2/27/2020 1423 by Jenny Thompson RN  Outcome: Adequate for Discharge     Problem: DISCHARGE PLANNING  Goal: Discharge to home or other facility with appropriate resources  Description  INTERVENTIONS:  - Identify barriers to discharge w/patient and caregiver  - Arrange for needed discharge resources and transportation as appropriate  - Identify discharge learning needs (meds, wound care, etc )  - Arrange for interpretive services to assist at discharge as needed  - Refer to Case Management Department for coordinating discharge planning if the patient needs post-hospital services based on physician/advanced practitioner order or complex needs related to functional status, cognitive ability, or social support system  2/27/2020 1813 by Jenny Thompson RN  Outcome: Adequate for Discharge  2/27/2020 1423 by Jenny Thompson RN  Outcome: Adequate for Discharge     Problem: Prexisting or High Potential for Compromised Skin Integrity  Goal: Skin integrity is maintained or improved  Description  INTERVENTIONS:  - Identify patients at risk for skin breakdown  - Assess and monitor skin integrity  - Assess and monitor nutrition and hydration status  - Monitor labs   - Assess for incontinence   - Turn and reposition patient  - Assist with mobility/ambulation  - Relieve pressure over bony prominences  - Avoid friction and shearing  - Provide appropriate hygiene as needed including keeping skin clean and dry  - Evaluate need for skin moisturizer/barrier cream  - Collaborate with interdisciplinary team   - Patient/family teaching  - Consider wound care consult   2/27/2020 1813 by Niesha Goldman RN  Outcome: Adequate for Discharge  2/27/2020 1423 by Niesha Goldman RN  Outcome: Adequate for Discharge     Problem: Potential for Falls  Goal: Patient will remain free of falls  Description  INTERVENTIONS:  - Assess patient frequently for physical needs  -  Identify cognitive and physical deficits and behaviors that affect risk of falls    -  [place] fall precautions as indicated by assessment   - Educate patient/family on patient safety including physical limitations  - Instruct patient to call for assistance with activity based on assessment  - Modify environment to reduce risk of injury  - Consider OT/PT consult to assist with strengthening/mobility  2/27/2020 1813 by Niesha Goldman RN  Outcome: Adequate for Discharge  2/27/2020 1423 by Niesha Goldman RN  Outcome: Adequate for Discharge

## 2020-02-27 NOTE — PLAN OF CARE
Problem: Nutrition/Hydration-ADULT  Goal: Nutrient/Hydration intake appropriate for improving, restoring or maintaining nutritional needs  Description  Monitor and assess patient's nutrition/hydration status for malnutrition  Collaborate with interdisciplinary team and initiate plan and interventions as ordered  Monitor patient's weight and dietary intake as ordered or per policy  Utilize nutrition screening tool and intervene as necessary  Determine patient's food preferences and provide high-protein, high-caloric foods as appropriate       INTERVENTIONS:  - Monitor oral intake, urinary output, labs, and treatment plans  - Assess nutrition and hydration status and recommend course of action  - Evaluate amount of meals eaten  - Assist patient with eating if necessary   - Allow adequate time for meals  - Recommend/ encourage appropriate diets, oral nutritional supplements, and vitamin/mineral supplements  - Order, calculate, and assess calorie counts as needed  - Recommend, monitor, and adjust tube feedings and TPN/PPN based on assessed needs  - Assess need for intravenous fluids  - Provide specific nutrition/hydration education as appropriate  - Include patient/family/caregiver in decisions related to nutrition  Outcome: Adequate for Discharge     Problem: PAIN - ADULT  Goal: Verbalizes/displays adequate comfort level or baseline comfort level  Description  Interventions:  - Encourage patient to monitor pain and request assistance  - Assess pain using appropriate pain scale  - Administer analgesics based on type and severity of pain and evaluate response  - Implement non-pharmacological measures as appropriate and evaluate response  - Consider cultural and social influences on pain and pain management  - Notify physician/advanced practitioner if interventions unsuccessful or patient reports new pain  Outcome: Adequate for Discharge     Problem: SAFETY ADULT  Goal: Patient will remain free of falls  Description  INTERVENTIONS:  - Assess patient frequently for physical needs  -  Identify cognitive and physical deficits and behaviors that affect risk of falls    -  [place] fall precautions as indicated by assessment   - Educate patient/family on patient safety including physical limitations  - Instruct patient to call for assistance with activity based on assessment  - Modify environment to reduce risk of injury  - Consider OT/PT consult to assist with strengthening/mobility  Outcome: Adequate for Discharge  Goal: Maintain or return to baseline ADL function  Description  INTERVENTIONS:  -  Assess patient's ability to carry out ADLs; assess patient's baseline for ADL function and identify physical deficits which impact ability to perform ADLs (bathing, care of mouth/teeth, toileting, grooming, dressing, etc )  - Assess/evaluate cause of self-care deficits   - Assess range of motion  - Assess patient's mobility; develop plan if impaired  - Assess patient's need for assistive devices and provide as appropriate  - Encourage maximum independence but intervene and supervise when necessary  - Involve family in performance of ADLs  - Assess for home care needs following discharge   - Consider OT consult to assist with ADL evaluation and planning for discharge  - Provide patient education as appropriate  Outcome: Adequate for Discharge  Goal: Maintain or return mobility status to optimal level  Description  INTERVENTIONS:  - Assess patient's baseline mobility status (ambulation, transfers, stairs, etc )    - Identify cognitive and physical deficits and behaviors that affect mobility  - Identify mobility aids required to assist with transfers and/or ambulation (gait belt, sit-to-stand, lift, walker, cane, etc )  - [place] fall precautions as indicated by assessment  - Record patient progress and toleration of activity level on Mobility SBAR; progress patient to next Phase/Stage  - Instruct patient to call for assistance with activity based on assessment  - Consider rehabilitation consult to assist with strengthening/weightbearing, etc   Outcome: Adequate for Discharge     Problem: DISCHARGE PLANNING  Goal: Discharge to home or other facility with appropriate resources  Description  INTERVENTIONS:  - Identify barriers to discharge w/patient and caregiver  - Arrange for needed discharge resources and transportation as appropriate  - Identify discharge learning needs (meds, wound care, etc )  - Arrange for interpretive services to assist at discharge as needed  - Refer to Case Management Department for coordinating discharge planning if the patient needs post-hospital services based on physician/advanced practitioner order or complex needs related to functional status, cognitive ability, or social support system  Outcome: Adequate for Discharge     Problem: Prexisting or High Potential for Compromised Skin Integrity  Goal: Skin integrity is maintained or improved  Description  INTERVENTIONS:  - Identify patients at risk for skin breakdown  - Assess and monitor skin integrity  - Assess and monitor nutrition and hydration status  - Monitor labs   - Assess for incontinence   - Turn and reposition patient  - Assist with mobility/ambulation  - Relieve pressure over bony prominences  - Avoid friction and shearing  - Provide appropriate hygiene as needed including keeping skin clean and dry  - Evaluate need for skin moisturizer/barrier cream  - Collaborate with interdisciplinary team   - Patient/family teaching  - Consider wound care consult   Outcome: Adequate for Discharge     Problem: Potential for Falls  Goal: Patient will remain free of falls  Description  INTERVENTIONS:  - Assess patient frequently for physical needs  -  Identify cognitive and physical deficits and behaviors that affect risk of falls    -  [place] fall precautions as indicated by assessment   - Educate patient/family on patient safety including physical limitations  - Instruct patient to call for assistance with activity based on assessment  - Modify environment to reduce risk of injury  - Consider OT/PT consult to assist with strengthening/mobility  Outcome: Adequate for Discharge

## 2020-03-24 NOTE — UTILIZATION REVIEW
Request for authorization status update  Notification of Discharge  This is a Notification of Discharge from our facility [address]  Please be advised that this patient has been discharge from our facility  Below you will find the admission and discharge date and time including the patients disposition  PRESENTATION DATE: 2/21/2020  9:12 AM  OBS ADMISSION DATE: N/A  IP ADMISSION DATE: 2/21/20 1131   DISCHARGE DATE: 2/27/2020  6:14 PM  DISPOSITION: Home/Self Care Home/Self Care   Admission Orders listed below:  Admission Orders (From admission, onward)     Ordered        02/21/20 1131  Inpatient Admission (expected length of stay for this patient Order details is greater than two midnights)  Once                   Please contact the UR Department if additional information is required to close this patient's authorization/case  [address] [facility] [address] Utilization Review Department  Main: [phone] x carefully listen to the prompts  All voicemails are confidential   [email or web address]  Send all requests for admission clinical reviews, approved or denied determinations and any other requests to dedicated fax number below belonging to the campus where the patient is receiving treatment   List of dedicated fax numbers:  [address] [place] [address] DENIALS (Administrative/Medical Necessity) [phone]   [address]  (Maternity/NICU/Pediatrics) [phone]   Jose Fernandez [phone]   Gisselle York [phone]   Reynaldo Taylor [phone]   [address] [phone]   [facility]  [phone]   [address] [facility]  [address] [facility] [address] [facility] [phone]

## 2020-05-04 ENCOUNTER — TRANSCRIBE ORDERS (OUTPATIENT)
Dept: ADMINISTRATIVE | Facility: HOSPITAL | Age: 60
End: 2020-05-04

## 2020-07-17 ENCOUNTER — APPOINTMENT (EMERGENCY)
Dept: CT IMAGING | Facility: HOSPITAL | Age: 60
End: 2020-07-17
Payer: COMMERCIAL

## 2020-07-17 ENCOUNTER — HOSPITAL ENCOUNTER (EMERGENCY)
Facility: HOSPITAL | Age: 60
Discharge: HOME | End: 2020-07-17
Attending: EMERGENCY MEDICINE | Admitting: EMERGENCY MEDICINE
Payer: COMMERCIAL

## 2020-07-17 VITALS
WEIGHT: 147 LBS | RESPIRATION RATE: 18 BRPM | SYSTOLIC BLOOD PRESSURE: 130 MMHG | BODY MASS INDEX: 23.73 KG/M2 | HEART RATE: 74 BPM | OXYGEN SATURATION: 97 % | DIASTOLIC BLOOD PRESSURE: 71 MMHG | TEMPERATURE: 97.4 F

## 2020-07-17 DIAGNOSIS — K57.92 DIVERTICULITIS: ICD-10-CM

## 2020-07-17 DIAGNOSIS — S39.012A STRAIN OF LUMBAR REGION, INITIAL ENCOUNTER: Primary | ICD-10-CM

## 2020-07-17 LAB
ALBUMIN SERPL BCP-MCNC: 3.7 G/DL (ref 3.5–5)
ALP SERPL-CCNC: 51 U/L (ref 46–116)
ALT SERPL W P-5'-P-CCNC: 22 U/L (ref 12–78)
ANION GAP SERPL CALCULATED.3IONS-SCNC: 7 MMOL/L (ref 4–13)
AST SERPL W P-5'-P-CCNC: 25 U/L (ref 5–45)
BACTERIA UR QL AUTO: ABNORMAL /HPF
BASOPHILS # BLD AUTO: 0.03 THOUSANDS/ΜL (ref 0–0.1)
BASOPHILS NFR BLD AUTO: 1 % (ref 0–1)
BILIRUB SERPL-MCNC: 0.36 MG/DL (ref 0.2–1)
BILIRUB UR QL STRIP: NEGATIVE
BUN SERPL-MCNC: 13 MG/DL (ref 5–25)
CALCIUM SERPL-MCNC: 9.2 MG/DL (ref 8.3–10.1)
CHLORIDE SERPL-SCNC: 103 MMOL/L (ref 100–108)
CLARITY UR: CLEAR
CO2 SERPL-SCNC: 30 MMOL/L (ref 21–32)
COLOR UR: YELLOW
CREAT SERPL-MCNC: 0.56 MG/DL (ref 0.6–1.3)
EOSINOPHIL # BLD AUTO: 0.07 THOUSAND/ΜL (ref 0–0.61)
EOSINOPHIL NFR BLD AUTO: 1 % (ref 0–6)
ERYTHROCYTE [DISTWIDTH] IN BLOOD BY AUTOMATED COUNT: 11.6 % (ref 11.6–15.1)
GFR SERPL CREATININE-BSD FRML MDRD: 102 ML/MIN/1.73SQ M
GLUCOSE SERPL-MCNC: 73 MG/DL (ref 65–140)
GLUCOSE UR STRIP-MCNC: NEGATIVE MG/DL
HCT VFR BLD AUTO: 47.1 % (ref 34.8–46.1)
HGB BLD-MCNC: 16.2 G/DL (ref 11.5–15.4)
HGB UR QL STRIP.AUTO: ABNORMAL
IMM GRANULOCYTES # BLD AUTO: 0.02 THOUSAND/UL (ref 0–0.2)
IMM GRANULOCYTES NFR BLD AUTO: 0 % (ref 0–2)
KETONES UR STRIP-MCNC: NEGATIVE MG/DL
LACTATE SERPL-SCNC: 0.4 MMOL/L (ref 0.5–2)
LEUKOCYTE ESTERASE UR QL STRIP: NEGATIVE
LIPASE SERPL-CCNC: 83 U/L (ref 73–393)
LYMPHOCYTES # BLD AUTO: 1.78 THOUSANDS/ΜL (ref 0.6–4.47)
LYMPHOCYTES NFR BLD AUTO: 29 % (ref 14–44)
MCH RBC QN AUTO: 32.1 PG (ref 26.8–34.3)
MCHC RBC AUTO-ENTMCNC: 34.4 G/DL (ref 31.4–37.4)
MCV RBC AUTO: 93 FL (ref 82–98)
MONOCYTES # BLD AUTO: 0.5 THOUSAND/ΜL (ref 0.17–1.22)
MONOCYTES NFR BLD AUTO: 8 % (ref 4–12)
NEUTROPHILS # BLD AUTO: 3.7 THOUSANDS/ΜL (ref 1.85–7.62)
NEUTS SEG NFR BLD AUTO: 61 % (ref 43–75)
NITRITE UR QL STRIP: NEGATIVE
NON-SQ EPI CELLS URNS QL MICRO: ABNORMAL /HPF
NRBC BLD AUTO-RTO: 0 /100 WBCS
PH UR STRIP.AUTO: 5.5 [PH]
PLATELET # BLD AUTO: 317 THOUSANDS/UL (ref 149–390)
PMV BLD AUTO: 9.4 FL (ref 8.9–12.7)
POTASSIUM SERPL-SCNC: 5 MMOL/L (ref 3.5–5.3)
PROT SERPL-MCNC: 7.3 G/DL (ref 6.4–8.2)
PROT UR STRIP-MCNC: NEGATIVE MG/DL
RBC # BLD AUTO: 5.05 MILLION/UL (ref 3.81–5.12)
RBC #/AREA URNS AUTO: ABNORMAL /HPF
SODIUM SERPL-SCNC: 140 MMOL/L (ref 136–145)
SP GR UR STRIP.AUTO: >=1.03 (ref 1–1.03)
UROBILINOGEN UR QL STRIP.AUTO: 0.2 E.U./DL
WBC # BLD AUTO: 6.1 THOUSAND/UL (ref 4.31–10.16)
WBC #/AREA URNS AUTO: ABNORMAL /HPF

## 2020-07-17 PROCEDURE — 81001 URINALYSIS AUTO W/SCOPE: CPT | Performed by: PHYSICIAN ASSISTANT

## 2020-07-17 PROCEDURE — 96360 HYDRATION IV INFUSION INIT: CPT

## 2020-07-17 PROCEDURE — 83690 ASSAY OF LIPASE: CPT | Performed by: PHYSICIAN ASSISTANT

## 2020-07-17 PROCEDURE — 74177 CT ABD & PELVIS W/CONTRAST: CPT

## 2020-07-17 PROCEDURE — 85025 COMPLETE CBC W/AUTO DIFF WBC: CPT | Performed by: PHYSICIAN ASSISTANT

## 2020-07-17 PROCEDURE — 36415 COLL VENOUS BLD VENIPUNCTURE: CPT | Performed by: PHYSICIAN ASSISTANT

## 2020-07-17 PROCEDURE — 96361 HYDRATE IV INFUSION ADD-ON: CPT

## 2020-07-17 PROCEDURE — 80053 COMPREHEN METABOLIC PANEL: CPT | Performed by: PHYSICIAN ASSISTANT

## 2020-07-17 PROCEDURE — 99285 EMERGENCY DEPT VISIT HI MDM: CPT | Performed by: PHYSICIAN ASSISTANT

## 2020-07-17 PROCEDURE — 99284 EMERGENCY DEPT VISIT MOD MDM: CPT

## 2020-07-17 PROCEDURE — 83605 ASSAY OF LACTIC ACID: CPT | Performed by: PHYSICIAN ASSISTANT

## 2020-07-17 RX ORDER — CIPROFLOXACIN 500 MG/1
500 TABLET, FILM COATED ORAL ONCE
Status: COMPLETED | OUTPATIENT
Start: 2020-07-17 | End: 2020-07-17

## 2020-07-17 RX ORDER — LIDOCAINE 50 MG/G
1 PATCH TOPICAL ONCE
Status: DISCONTINUED | OUTPATIENT
Start: 2020-07-17 | End: 2020-07-17 | Stop reason: HOSPADM

## 2020-07-17 RX ORDER — METRONIDAZOLE 500 MG/1
500 TABLET ORAL ONCE
Status: COMPLETED | OUTPATIENT
Start: 2020-07-17 | End: 2020-07-17

## 2020-07-17 RX ORDER — METRONIDAZOLE 500 MG/1
500 TABLET ORAL EVERY 8 HOURS SCHEDULED
Qty: 21 TABLET | Refills: 0 | Status: SHIPPED | OUTPATIENT
Start: 2020-07-17 | End: 2020-07-24

## 2020-07-17 RX ORDER — ACETAMINOPHEN 325 MG/1
650 TABLET ORAL ONCE
Status: COMPLETED | OUTPATIENT
Start: 2020-07-17 | End: 2020-07-17

## 2020-07-17 RX ORDER — CYCLOBENZAPRINE HCL 5 MG
5 TABLET ORAL
Qty: 5 TABLET | Refills: 0 | Status: SHIPPED | OUTPATIENT
Start: 2020-07-17 | End: 2020-07-22

## 2020-07-17 RX ORDER — OXYCODONE HYDROCHLORIDE 10 MG/1
TABLET ORAL
COMMUNITY
Start: 2014-10-01

## 2020-07-17 RX ORDER — CIPROFLOXACIN 500 MG/1
500 TABLET, FILM COATED ORAL 2 TIMES DAILY
Qty: 14 TABLET | Refills: 0 | Status: SHIPPED | OUTPATIENT
Start: 2020-07-17 | End: 2020-07-24

## 2020-07-17 RX ADMIN — ACETAMINOPHEN 650 MG: 325 TABLET ORAL at 12:48

## 2020-07-17 RX ADMIN — CIPROFLOXACIN HYDROCHLORIDE 500 MG: 500 TABLET, FILM COATED ORAL at 14:44

## 2020-07-17 RX ADMIN — SODIUM CHLORIDE 1000 ML: 0.9 INJECTION, SOLUTION INTRAVENOUS at 12:50

## 2020-07-17 RX ADMIN — LIDOCAINE 1 PATCH: 50 PATCH TOPICAL at 12:46

## 2020-07-17 RX ADMIN — METRONIDAZOLE 500 MG: 500 TABLET, FILM COATED ORAL at 14:44

## 2020-07-17 RX ADMIN — IOHEXOL 100 ML: 350 INJECTION, SOLUTION INTRAVENOUS at 14:10

## 2020-07-17 NOTE — ED PROVIDER NOTES
History  Chief Complaint   Patient presents with    Back Pain     right sided lower back pain since beginning of week, states while lifting a heavy box at work had a "bolt of lightening" in back, states since is unable to stand upright, pain radiated down right leg every so often     71-year-old female presented to the emergency department for evaluation of right lower back/flank pain  Patient reports the pain started on Tuesday when she was lifting a box of copy  paper at work  She denies any midline back pain  She reports history of spinal stenosis however notes this does not feel same  She denies any loss of bowel or bladder control  She denies any fevers or chills  She denies any IV drug use  Patient denies any saddle paresthesias  She reports occasional "lighting bolt" of pain into her right leg  She states pain is worse with movement  She denies any abdominal pain  She denies any nausea, vomiting, diarrhea  Patient reports she has been taking her oxycodone at home without improvement of symptoms  She reports she takes this chronically  Prior to Admission Medications   Prescriptions Last Dose Informant Patient Reported? Taking?   oxyCODONE (ROXICODONE) 10 MG TABS   Yes Yes   Sig: Take by mouth      Facility-Administered Medications: None       Past Medical History:   Diagnosis Date    Diverticulitis     Spinal stenosis        Past Surgical History:   Procedure Laterality Date    CARPAL TUNNEL RELEASE      CERVICAL FUSION       SECTION      HYSTERECTOMY      NECK SURGERY      TONSILLECTOMY         History reviewed  No pertinent family history  I have reviewed and agree with the history as documented      E-Cigarette/Vaping    E-Cigarette Use Never User      E-Cigarette/Vaping Substances     Social History     Tobacco Use    Smoking status: Current Every Day Smoker     Packs/day: 0 50     Years: 0 00     Pack years: 0 00     Types: Cigarettes    Smokeless tobacco: Never Used   Substance Use Topics    Alcohol use: Not Currently    Drug use: Not Currently       Review of Systems   Constitutional: Negative for appetite change, chills, diaphoresis, fatigue and fever  HENT: Negative  Eyes: Negative for visual disturbance  Respiratory: Negative for cough, choking, chest tightness, shortness of breath and stridor  Cardiovascular: Negative for chest pain, palpitations and leg swelling  Gastrointestinal: Negative for abdominal pain, anal bleeding, blood in stool, constipation, diarrhea, nausea, rectal pain and vomiting  Genitourinary: Negative  Negative for decreased urine volume, difficulty urinating, dyspareunia, dysuria, enuresis, flank pain, frequency, genital sores, hematuria, pelvic pain, urgency, vaginal bleeding, vaginal discharge and vaginal pain  Musculoskeletal: Positive for back pain and gait problem (Due to right back pain)  Negative for arthralgias, joint swelling, myalgias, neck pain and neck stiffness  Skin: Negative  Neurological: Negative for dizziness, syncope, facial asymmetry, weakness, light-headedness, numbness and headaches  Psychiatric/Behavioral: Negative  All other systems reviewed and are negative  Physical Exam  Physical Exam   Constitutional: She is oriented to person, place, and time  She appears well-developed and well-nourished  No distress  Patient sitting on the edge of the bed, looks uncomfortable  Difficulty moving from sitting to lying position   HENT:   Head: Normocephalic and atraumatic  Right Ear: External ear normal    Left Ear: External ear normal    Eyes: Pupils are equal, round, and reactive to light  Conjunctivae and EOM are normal    Neck: Normal range of motion  Neck supple  Excellent range of motion   Cardiovascular: Normal rate and regular rhythm  Pulmonary/Chest: Effort normal and breath sounds normal  No stridor  No respiratory distress  She has no wheezes  She has no rales   She exhibits no tenderness  Abdominal: Soft  Bowel sounds are normal  There is tenderness in the right lower quadrant  There is no rigidity, no rebound, no guarding, no CVA tenderness, no tenderness at McBurney's point and negative Heredia's sign  No hernia  Musculoskeletal: She exhibits no edema or deformity  Cervical back: Normal  She exhibits normal range of motion, no tenderness, no bony tenderness, no swelling, no edema, no deformity and no spasm  Thoracic back: She exhibits tenderness  She exhibits normal range of motion, no bony tenderness, no swelling, no edema, no deformity and normal pulse  Lumbar back: She exhibits decreased range of motion, tenderness and spasm  She exhibits no bony tenderness, no swelling, no edema and no deformity  Back:    Neurological: She is alert and oriented to person, place, and time  She displays normal reflexes  No sensory deficit  She exhibits normal muscle tone  Coordination normal    Skin: Skin is warm and dry  Capillary refill takes less than 2 seconds  She is not diaphoretic  No erythema  Psychiatric: She has a normal mood and affect  Her behavior is normal  Judgment and thought content normal    Nursing note and vitals reviewed        Vital Signs  ED Triage Vitals [07/17/20 1229]   Temperature Pulse Respirations Blood Pressure SpO2   (!) 97 4 °F (36 3 °C) 80 18 128/73 96 %      Temp Source Heart Rate Source Patient Position - Orthostatic VS BP Location FiO2 (%)   Temporal Monitor Sitting Left arm --      Pain Score       Worst Possible Pain           Vitals:    07/17/20 1229 07/17/20 1450   BP: 128/73 130/71   Pulse: 80 74   Patient Position - Orthostatic VS: Sitting Sitting         Visual Acuity      ED Medications  Medications   lidocaine (LIDODERM) 5 % patch 1 patch (1 patch Topical Medication Applied 7/17/20 1246)   sodium chloride 0 9 % bolus 1,000 mL (0 mL Intravenous Stopped 7/17/20 1427)   acetaminophen (TYLENOL) tablet 650 mg (650 mg Oral Given 7/17/20 1248)   iohexol (OMNIPAQUE) 350 MG/ML injection (MULTI-DOSE) 100 mL (100 mL Intravenous Given 7/17/20 1410)   ciprofloxacin (CIPRO) tablet 500 mg (500 mg Oral Given 7/17/20 1444)   metroNIDAZOLE (FLAGYL) tablet 500 mg (500 mg Oral Given 7/17/20 1444)       Diagnostic Studies  Results Reviewed     Procedure Component Value Units Date/Time    Urine Microscopic [[id number]]  (Abnormal) Collected:  07/17/20 1313    Lab Status:  Final result Specimen:  Urine Updated:  07/17/20 1352     RBC, UA 0-1 /hpf      WBC, UA 0-1 /hpf      Epithelial Cells Occasional /hpf      Bacteria, UA None Seen /hpf     UA w Reflex to Microscopic w Reflex to Culture [[id number]]  (Abnormal) Collected:  07/17/20 1313    Lab Status:  Final result Specimen:  Urine Updated:  07/17/20 1351     Color, UA Yellow     Clarity, UA Clear     Specific Gravity, UA >=1 030     pH, UA 5 5     Leukocytes, UA Negative     Nitrite, UA Negative     Protein, UA Negative mg/dl      Glucose, UA Negative mg/dl      Ketones, UA Negative mg/dl      Urobilinogen, UA 0 2 E U /dl      Bilirubin, UA Negative     Blood, UA Trace-lysed    Comprehensive metabolic panel [[id number]]  (Abnormal) Collected:  07/17/20 1313    Lab Status:  Final result Specimen:  Blood Updated:  07/17/20 1344     Sodium 140 mmol/L      Potassium 5 0 mmol/L      Chloride 103 mmol/L      CO2 30 mmol/L      ANION GAP 7 mmol/L      BUN 13 mg/dL      Creatinine 0 56 mg/dL      Glucose 73 mg/dL      Calcium 9 2 mg/dL      AST 25 U/L      ALT 22 U/L      Alkaline Phosphatase 51 U/L      Total Protein 7 3 g/dL      Albumin 3 7 g/dL      Total Bilirubin 0 36 mg/dL      eGFR 102 ml/min/1 73sq m     Narrative:       [place] guidelines for Chronic Kidney Disease (CKD):     Stage 1 with normal or high GFR (GFR > 90 mL/min/1 73 square meters)    Stage 2 Mild CKD (GFR = 60-89 mL/min/1 73 square meters)    Stage 3A Moderate CKD (GFR = 45-59 mL/min/1 73 square meters)    Stage 3B Moderate CKD (GFR = 30-44 mL/min/1 73 square meters)    Stage 4 Severe CKD (GFR = 15-29 mL/min/1 73 square meters)    Stage 5 End Stage CKD (GFR <15 mL/min/1 73 square meters)  Note: GFR calculation is accurate only with a steady state creatinine    Lipase [[id number]]  (Normal) Collected:  07/17/20 1313    Lab Status:  Final result Specimen:  Blood Updated:  07/17/20 1344     Lipase 83 u/L     Lactic acid [[id number]]  (Abnormal) Collected:  07/17/20 1313    Lab Status:  Final result Specimen:  Blood Updated:  07/17/20 1333     LACTIC ACID 0 4 mmol/L     Narrative:       Result may be elevated if tourniquet was used during collection  CBC and differential [[id number]]  (Abnormal) Collected:  07/17/20 1313    Lab Status:  Final result Specimen:  Blood Updated:  07/17/20 1317     WBC 6 10 Thousand/uL      RBC 5 05 Million/uL      Hemoglobin 16 2 g/dL      Hematocrit 47 1 %      MCV 93 fL      MCH 32 1 pg      MCHC 34 4 g/dL      RDW 11 6 %      MPV 9 4 fL      Platelets 519 Thousands/uL      nRBC 0 /100 WBCs      Neutrophils Relative 61 %      Immat GRANS % 0 %      Lymphocytes Relative 29 %      Monocytes Relative 8 %      Eosinophils Relative 1 %      Basophils Relative 1 %      Neutrophils Absolute 3 70 Thousands/µL      Immature Grans Absolute 0 02 Thousand/uL      Lymphocytes Absolute 1 78 Thousands/µL      Monocytes Absolute 0 50 Thousand/µL      Eosinophils Absolute 0 07 Thousand/µL      Basophils Absolute 0 03 Thousands/µL                  CT abdomen pelvis with contrast   Final Result by Estee Carvajal MD (07/17 1427)      Sigmoid diverticulosis with equivocal findings of diverticulitis with wall thickening and slight stranding  The appendix is normal       No collection is seen      Possible gallstones        This was discussed with Dr Gerda Sarmiento in the emergency room            Workstation performed: [id number]                    Procedures  Procedures         ED Course  ED Course as of Jul 17 1455   Fri Jul 17, 2020   1356 Normal Wbc  CMP relatively unremarkable  Lactic acid 0 4  Lipase within normal limits  Trace blood in UA no signs of UTI      1430 CT abd:   Sigmoid diverticulosis with equivocal findings of diverticulitis with wall thickening and slight stranding  The appendix is normal   No collection is seen  Possible gallstones  1434 I discussed results and findings with patient  Patient reports significant improvement lidocaine patch of her back pain  Patient did drive to the emergency department so we unable to trial any muscle relaxers but will prescribe Flexeril to her pharmacy to take at night  Patient was educated not take the Flexeril when driving  Will treat patient for diverticulitis with Cipro Flagyl  Well patient follow-up with PCP  Patient was educated on symptoms that require prompt return to the ED for further evaluation verbalized understanding  She agreed to treatment plan, remained well ED and was discharged home  US AUDIT      Most Recent Value   Initial Alcohol Screen: US AUDIT-C    1  How often do you have a drink containing alcohol?  0 Filed at: 07/17/2020 1230   2  How many drinks containing alcohol do you have on a typical day you are drinking? 0 Filed at: 07/17/2020 1230   3b  FEMALE Any Age, or MALE 65+: How often do you have 4 or more drinks on one occassion? 0 Filed at: 07/17/2020 1230   Audit-C Score  0 Filed at: 07/17/2020 1230                  ANATOLIY/DAST-10      Most Recent Value   How many times in the past year have you    Used an illegal drug or used a prescription medication for non-medical reasons?   Never Filed at: 07/17/2020 1230                                MDM  Number of Diagnoses or Management Options  Diverticulitis: new and requires workup  Strain of lumbar region, initial encounter: new and requires workup  Diagnosis management comments: Differential diagnosis includes but not limited to:  Back sprain, back spasm, cauda equina, kidney stone, UTI, pyelonephritis, appendicitis, diverticulitis  Vitals within normal limits  Medical record reviewed  On physical exam patient has difficulty with movement due to back pain  She does have right lower back tenderness to palpation  Patient additionally had right lower quadrant abdominal tenderness  She has no red flags for cauda equina syndrome  Patient did have trace blood in the urinalysis with no concern for UTI  CT concerning for possible diverticulitis and gallstones  Normal appendix  Kidneys were unremarkable  No hydronephrosis  Patient has significant improvement of pain with Tylenol and lidocaine patch  Patient was educated on symptomatic treatment and symptoms that require prompt return to the ED for further evaluation and patient verbalized understanding  Flexeril was sent to pharmacy for muscle strain along with Cipro and Flagyl for treatment of diverticulitis  Patient agreed to treatment plan, she remained well ED and was discharged home  Amount and/or Complexity of Data Reviewed  Clinical lab tests: ordered and reviewed  Tests in the radiology section of CPT®: reviewed and ordered  Review and summarize past medical records: yes  Discuss the patient with other providers: yes  Independent visualization of images, tracings, or specimens: yes          Disposition  Final diagnoses:   Strain of lumbar region, initial encounter   Diverticulitis     Time reflects when diagnosis was documented in both MDM as applicable and the Disposition within this note     Time User Action Codes Description Comment    7/17/2020  2:39 PM Becky Pereira Add [S39 012A] Strain of lumbar region, initial encounter     7/17/2020  2:39 PM Becky Pereira Add [K57 [id number]] Diverticulitis       ED Disposition     ED Disposition Condition Date/Time Comment    Discharge Stable Fri Jul 17, 2020  2:39 PM Leda Rain discharge to home/self care              Follow-up Information     Follow up With Specialties Details Why Contact Info    Ira Garcia MD Family Medicine In 1 week For continued care, As needed, If symptoms worsen [address]  [phone]            Discharge Medication List as of 7/17/2020  2:41 PM      START taking these medications    Details   ciprofloxacin (CIPRO) 500 mg tablet Take 1 tablet (500 mg total) by mouth 2 (two) times a day for 7 days, Starting Fri 7/17/2020, Until Fri 7/24/2020, Normal      cyclobenzaprine (FLEXERIL) 5 mg tablet Take 1 tablet (5 mg total) by mouth daily at bedtime for 5 days, Starting Fri 7/17/2020, Until Wed 7/22/2020, Normal      metroNIDAZOLE (FLAGYL) 500 mg tablet Take 1 tablet (500 mg total) by mouth every 8 (eight) hours for 7 days, Starting Fri 7/17/2020, Until Fri 7/24/2020, Normal         CONTINUE these medications which have NOT CHANGED    Details   oxyCODONE (ROXICODONE) 10 MG TABS Take by mouth, Starting Wed 10/1/2014, Historical Med           No discharge procedures on file      PDMP Review     None          ED Provider  Electronically Signed by           May Le PA-C  07/17/20 7639

## 2020-07-17 NOTE — ED NOTES
Patient transported to [address] [place] [address], [address] [place] [address]  07/17/20 0333

## 2023-07-11 NOTE — PLAN OF CARE
Problem: Nutrition/Hydration-ADULT  Goal: Nutrient/Hydration intake appropriate for improving, restoring or maintaining nutritional needs  Description  Monitor and assess patient's nutrition/hydration status for malnutrition  Collaborate with interdisciplinary team and initiate plan and interventions as ordered  Monitor patient's weight and dietary intake as ordered or per policy  Utilize nutrition screening tool and intervene as necessary  Determine patient's food preferences and provide high-protein, high-caloric foods as appropriate       INTERVENTIONS:  - Monitor oral intake, urinary output, labs, and treatment plans  - Assess nutrition and hydration status and recommend course of action  - Evaluate amount of meals eaten  - Assist patient with eating if necessary   - Allow adequate time for meals  - Recommend/ encourage appropriate diets, oral nutritional supplements, and vitamin/mineral supplements  - Order, calculate, and assess calorie counts as needed  - Recommend, monitor, and adjust tube feedings and TPN/PPN based on assessed needs  - Assess need for intravenous fluids  - Provide specific nutrition/hydration education as appropriate  - Include patient/family/caregiver in decisions related to nutrition  Outcome: Not Progressing     Problem: PAIN - ADULT  Goal: Verbalizes/displays adequate comfort level or baseline comfort level  Description  Interventions:  - Encourage patient to monitor pain and request assistance  - Assess pain using appropriate pain scale  - Administer analgesics based on type and severity of pain and evaluate response  - Implement non-pharmacological measures as appropriate and evaluate response  - Consider cultural and social influences on pain and pain management  - Notify physician/advanced practitioner if interventions unsuccessful or patient reports new pain  Outcome: Not Progressing     Problem: INFECTION - ADULT  Goal: Absence or prevention of progression during Detail Level: Detailed hospitalization  Description  INTERVENTIONS:  - Assess and monitor for signs and symptoms of infection  - Monitor lab/diagnostic results  - Monitor all insertion sites, i e  indwelling lines, tubes, and drains  - Monitor endotracheal if appropriate and nasal secretions for changes in amount and color  - [place] appropriate cooling/warming therapies per order  - Administer medications as ordered  - Instruct and encourage patient and family to use good hand hygiene technique  - Identify and instruct in appropriate isolation precautions for identified infection/condition  Outcome: Not Progressing  Goal: Absence of fever/infection during neutropenic period  Description  INTERVENTIONS:  - Monitor WBC    Outcome: Not Progressing     Problem: SAFETY ADULT  Goal: Patient will remain free of falls  Description  INTERVENTIONS:  - Assess patient frequently for physical needs  -  Identify cognitive and physical deficits and behaviors that affect risk of falls    -  [place] fall precautions as indicated by assessment   - Educate patient/family on patient safety including physical limitations  - Instruct patient to call for assistance with activity based on assessment  - Modify environment to reduce risk of injury  - Consider OT/PT consult to assist with strengthening/mobility  Outcome: Not Progressing  Goal: Maintain or return to baseline ADL function  Description  INTERVENTIONS:  -  Assess patient's ability to carry out ADLs; assess patient's baseline for ADL function and identify physical deficits which impact ability to perform ADLs (bathing, care of mouth/teeth, toileting, grooming, dressing, etc )  - Assess/evaluate cause of self-care deficits   - Assess range of motion  - Assess patient's mobility; develop plan if impaired  - Assess patient's need for assistive devices and provide as appropriate  - Encourage maximum independence but intervene and supervise when necessary  - Involve family in performance of ADLs  - Assess for home care needs following discharge   - Consider OT consult to assist with ADL evaluation and planning for discharge  - Provide patient education as appropriate  Outcome: Not Progressing  Goal: Maintain or return mobility status to optimal level  Description  INTERVENTIONS:  - Assess patient's baseline mobility status (ambulation, transfers, stairs, etc )    - Identify cognitive and physical deficits and behaviors that affect mobility  - Identify mobility aids required to assist with transfers and/or ambulation (gait belt, sit-to-stand, lift, walker, cane, etc )  - [place] fall precautions as indicated by assessment  - Record patient progress and toleration of activity level on Mobility SBAR; progress patient to next Phase/Stage  - Instruct patient to call for assistance with activity based on assessment  - Consider rehabilitation consult to assist with strengthening/weightbearing, etc   Outcome: Not Progressing     Problem: DISCHARGE PLANNING  Goal: Discharge to home or other facility with appropriate resources  Description  INTERVENTIONS:  - Identify barriers to discharge w/patient and caregiver  - Arrange for needed discharge resources and transportation as appropriate  - Identify discharge learning needs (meds, wound care, etc )  - Arrange for interpretive services to assist at discharge as needed  - Refer to Case Management Department for coordinating discharge planning if the patient needs post-hospital services based on physician/advanced practitioner order or complex needs related to functional status, cognitive ability, or social support system  Outcome: Not Progressing     Problem: Prexisting or High Potential for Compromised Skin Integrity  Goal: Skin integrity is maintained or improved  Description  INTERVENTIONS:  - Identify patients at risk for skin breakdown  - Assess and monitor skin integrity  - Assess and monitor nutrition and hydration status  - Monitor labs   - Assess for Continue Regimen: Plaquenil twice daily as prescribed by her rheumatologist \\nClobetasol 0.05% scalp solution twice daily x 2 weeks for flares\\nProtopic (after 2 weeks, alt clobetasol and protopic to avoid atrophy)\\nDiscussing other options with MDs and will contact her next week \\nWear a hat when outside to avoid UV trigger for flares incontinence   - Turn and reposition patient  - Assist with mobility/ambulation  - Relieve pressure over bony prominences  - Avoid friction and shearing  - Provide appropriate hygiene as needed including keeping skin clean and dry  - Evaluate need for skin moisturizer/barrier cream  - Collaborate with interdisciplinary team   - Patient/family teaching  - Consider wound care consult   Outcome: Not Progressing     Problem: Potential for Falls  Goal: Patient will remain free of falls  Description  INTERVENTIONS:  - Assess patient frequently for physical needs  -  Identify cognitive and physical deficits and behaviors that affect risk of falls    -  [place] fall precautions as indicated by assessment   - Educate patient/family on patient safety including physical limitations  - Instruct patient to call for assistance with activity based on assessment  - Modify environment to reduce risk of injury  - Consider OT/PT consult to assist with strengthening/mobility  Outcome: Not Progressing

## 2023-08-30 ENCOUNTER — DOCTOR'S OFFICE (OUTPATIENT)
Dept: URBAN - NONMETROPOLITAN AREA CLINIC 1 | Facility: CLINIC | Age: 63
Setting detail: OPHTHALMOLOGY
End: 2023-08-30
Payer: COMMERCIAL

## 2023-08-30 ENCOUNTER — RX ONLY (RX ONLY)
Age: 63
End: 2023-08-30

## 2023-08-30 DIAGNOSIS — H40.033: ICD-10-CM

## 2023-08-30 DIAGNOSIS — H35.373: ICD-10-CM

## 2023-08-30 DIAGNOSIS — H25.041: ICD-10-CM

## 2023-08-30 DIAGNOSIS — H25.13: ICD-10-CM

## 2023-08-30 PROCEDURE — 92020 GONIOSCOPY: CPT | Performed by: OPHTHALMOLOGY

## 2023-08-30 PROCEDURE — 92134 CPTRZ OPH DX IMG PST SGM RTA: CPT | Performed by: OPHTHALMOLOGY

## 2023-08-30 PROCEDURE — 92004 COMPRE OPH EXAM NEW PT 1/>: CPT | Performed by: OPHTHALMOLOGY

## 2023-08-30 ASSESSMENT — KERATOMETRY
OS_K1POWER_DIOPTERS: 42.75
OS_K2POWER_DIOPTERS: 43.25
OD_K1POWER_DIOPTERS: 44.75
OD_K2POWER_DIOPTERS: 48.75
OD_AXISANGLE_DEGREES: 085
OS_AXISANGLE_DEGREES: 135

## 2023-08-30 ASSESSMENT — VISUAL ACUITY
OS_BCVA: 20/50
OD_BCVA: 20/40-1

## 2023-08-30 ASSESSMENT — REFRACTION_AUTOREFRACTION
OS_AXIS: 045
OD_CYLINDER: -0.50
OS_SPHERE: +4.75
OS_CYLINDER: -1.00
OD_AXIS: 024
OD_SPHERE: +3.75

## 2023-08-30 ASSESSMENT — CONFRONTATIONAL VISUAL FIELD TEST (CVF)
OD_FINDINGS: FULL
OS_FINDINGS: FULL

## 2023-08-30 ASSESSMENT — AXIALLENGTH_DERIVED
OD_AL: 21.2846
OS_AL: 22.2062

## 2023-08-30 ASSESSMENT — SPHEQUIV_DERIVED
OD_SPHEQUIV: 3.5
OS_SPHEQUIV: 4.25

## 2023-08-30 ASSESSMENT — REFRACTION_CURRENTRX
OS_OVR_VA: 20/
OD_OVR_VA: 20/

## 2023-10-26 ENCOUNTER — DOCTOR'S OFFICE (OUTPATIENT)
Dept: URBAN - NONMETROPOLITAN AREA CLINIC 1 | Facility: CLINIC | Age: 63
Setting detail: OPHTHALMOLOGY
End: 2023-10-26
Payer: COMMERCIAL

## 2023-10-26 DIAGNOSIS — H25.11: ICD-10-CM

## 2023-10-26 PROCEDURE — 92136 OPHTHALMIC BIOMETRY: CPT | Mod: RT | Performed by: OPHTHALMOLOGY

## 2023-10-31 ENCOUNTER — DOCTOR'S OFFICE (OUTPATIENT)
Dept: URBAN - NONMETROPOLITAN AREA CLINIC 1 | Facility: CLINIC | Age: 63
Setting detail: OPHTHALMOLOGY
End: 2023-10-31
Payer: COMMERCIAL

## 2023-10-31 DIAGNOSIS — H25.041: ICD-10-CM

## 2023-10-31 DIAGNOSIS — H25.13: ICD-10-CM

## 2023-10-31 PROCEDURE — 99213 OFFICE O/P EST LOW 20 MIN: CPT | Performed by: OPHTHALMOLOGY

## 2023-10-31 ASSESSMENT — REFRACTION_AUTOREFRACTION
OD_SPHERE: +3.75
OD_AXIS: 024
OS_AXIS: 045
OS_CYLINDER: -1.00
OS_SPHERE: +4.75

## 2023-10-31 ASSESSMENT — TONOMETRY
OS_IOP_MMHG: 18
OD_IOP_MMHG: 18

## 2023-10-31 ASSESSMENT — REFRACTION_CURRENTRX
OS_OVR_VA: 20/
OD_OVR_VA: 20/

## 2023-10-31 ASSESSMENT — SPHEQUIV_DERIVED: OS_SPHEQUIV: 4.25

## 2023-10-31 ASSESSMENT — VISUAL ACUITY
OS_BCVA: 20/40-2
OD_BCVA: 20/40-1

## 2023-10-31 ASSESSMENT — CONFRONTATIONAL VISUAL FIELD TEST (CVF)
OS_FINDINGS: FULL
OD_FINDINGS: FULL

## 2023-11-01 ENCOUNTER — AMBUL SURGICAL CARE (OUTPATIENT)
Dept: URBAN - NONMETROPOLITAN AREA SURGERY 1 | Facility: SURGERY | Age: 63
Setting detail: OPHTHALMOLOGY
End: 2023-11-01
Payer: COMMERCIAL

## 2023-11-01 DIAGNOSIS — H25.13: ICD-10-CM

## 2023-11-01 PROCEDURE — MISC CHARG MISC CHARGE: Performed by: OPHTHALMOLOGY

## 2023-11-07 ENCOUNTER — AMBUL SURGICAL CARE (OUTPATIENT)
Dept: URBAN - NONMETROPOLITAN AREA SURGERY 1 | Facility: SURGERY | Age: 63
Setting detail: OPHTHALMOLOGY
End: 2023-11-07
Payer: COMMERCIAL

## 2023-11-07 DIAGNOSIS — H25.11: ICD-10-CM

## 2023-11-07 DIAGNOSIS — H25.041: ICD-10-CM

## 2023-11-07 PROCEDURE — V2788 PRESBYOPIA-CORRECT FUNCTION: HCPCS | Mod: GA | Performed by: OPHTHALMOLOGY

## 2023-11-07 PROCEDURE — G8918 PT W/O PREOP ORDER IV AB PRO: HCPCS | Mod: RT | Performed by: CLINIC/CENTER

## 2023-11-07 PROCEDURE — G8918 PT W/O PREOP ORDER IV AB PRO: HCPCS | Mod: RT | Performed by: OPHTHALMOLOGY

## 2023-11-07 PROCEDURE — 66984 XCAPSL CTRC RMVL W/O ECP: CPT | Mod: RT | Performed by: OPHTHALMOLOGY

## 2023-11-07 PROCEDURE — 66984 XCAPSL CTRC RMVL W/O ECP: CPT | Mod: RT | Performed by: CLINIC/CENTER

## 2023-11-07 PROCEDURE — G8907 PT DOC NO EVENTS ON DISCHARG: HCPCS | Mod: RT | Performed by: OPHTHALMOLOGY

## 2023-11-07 PROCEDURE — G8907 PT DOC NO EVENTS ON DISCHARG: HCPCS | Mod: RT | Performed by: CLINIC/CENTER

## 2023-11-08 ENCOUNTER — RX ONLY (RX ONLY)
Age: 63
End: 2023-11-08

## 2023-11-08 ENCOUNTER — DOCTOR'S OFFICE (OUTPATIENT)
Dept: URBAN - NONMETROPOLITAN AREA CLINIC 1 | Facility: CLINIC | Age: 63
Setting detail: OPHTHALMOLOGY
End: 2023-11-08

## 2023-11-08 DIAGNOSIS — Z96.1: ICD-10-CM

## 2023-11-08 PROCEDURE — 99024 POSTOP FOLLOW-UP VISIT: CPT | Performed by: OPTOMETRIST

## 2023-11-08 ASSESSMENT — KERATOMETRY
OS_K2POWER_DIOPTERS: 43.25
OS_K1POWER_DIOPTERS: 42.75
OS_AXISANGLE_DEGREES: 135
OD_AXISANGLE_DEGREES: 085
OD_K2POWER_DIOPTERS: 48.75
OD_K1POWER_DIOPTERS: 44.75

## 2023-11-08 ASSESSMENT — REFRACTION_AUTOREFRACTION
OS_CYLINDER: -1.00
OD_SPHERE: +1.00
OD_CYLINDER: -0.75
OD_AXIS: 130
OS_SPHERE: +3.75
OS_AXIS: 053

## 2023-11-08 ASSESSMENT — REFRACTION_CURRENTRX
OS_OVR_VA: 20/
OD_OVR_VA: 20/

## 2023-11-08 ASSESSMENT — SPHEQUIV_DERIVED
OD_SPHEQUIV: 0.625
OS_SPHEQUIV: 3.25

## 2023-11-08 ASSESSMENT — TONOMETRY: OD_IOP_MMHG: 20

## 2023-11-08 ASSESSMENT — AXIALLENGTH_DERIVED
OS_AL: 22.5568
OD_AL: 22.2372

## 2023-11-08 ASSESSMENT — VISUAL ACUITY
OD_BCVA: 20/60+2
OS_BCVA: 20/30

## 2023-11-17 ENCOUNTER — DOCTOR'S OFFICE (OUTPATIENT)
Dept: URBAN - NONMETROPOLITAN AREA CLINIC 1 | Facility: CLINIC | Age: 63
Setting detail: OPHTHALMOLOGY
End: 2023-11-17
Payer: COMMERCIAL

## 2023-11-17 DIAGNOSIS — Z96.1: ICD-10-CM

## 2023-11-17 DIAGNOSIS — H25.12: ICD-10-CM

## 2023-11-17 PROCEDURE — 99024 POSTOP FOLLOW-UP VISIT: CPT | Performed by: OPHTHALMOLOGY

## 2023-11-17 PROCEDURE — 92136 OPHTHALMIC BIOMETRY: CPT | Mod: 26,LT | Performed by: OPHTHALMOLOGY

## 2023-11-17 ASSESSMENT — REFRACTION_AUTOREFRACTION
OD_AXIS: 019
OS_SPHERE: +4.25
OD_CYLINDER: -0.50
OS_CYLINDER: -0.25
OD_SPHERE: +0.50
OS_AXIS: 179

## 2023-11-17 ASSESSMENT — KERATOMETRY
OD_K1POWER_DIOPTERS: 42.75
OD_K2POWER_DIOPTERS: 44.00
OS_K2POWER_DIOPTERS: 44.25
OS_K1POWER_DIOPTERS: 43.75
OS_AXISANGLE_DEGREES: 127
OD_AXISANGLE_DEGREES: 114

## 2023-11-17 ASSESSMENT — VISUAL ACUITY
OS_BCVA: 20/25+2
OD_BCVA: 20/50+2

## 2023-11-17 ASSESSMENT — REFRACTION_CURRENTRX
OD_OVR_VA: 20/
OS_OVR_VA: 20/

## 2023-11-17 ASSESSMENT — SPHEQUIV_DERIVED
OS_SPHEQUIV: 4.125
OD_SPHEQUIV: 0.25

## 2023-11-17 ASSESSMENT — TONOMETRY
OD_IOP_MMHG: 15
OS_IOP_MMHG: 15

## 2023-11-17 ASSESSMENT — AXIALLENGTH_DERIVED
OD_AL: 23.5405
OS_AL: 21.9275

## 2023-11-20 ENCOUNTER — AMBUL SURGICAL CARE (OUTPATIENT)
Dept: URBAN - NONMETROPOLITAN AREA SURGERY 1 | Facility: SURGERY | Age: 63
Setting detail: OPHTHALMOLOGY
End: 2023-11-20
Payer: COMMERCIAL

## 2023-11-20 DIAGNOSIS — H25.13: ICD-10-CM

## 2023-11-20 PROCEDURE — MISC CHARG MISC CHARGE: Performed by: OPHTHALMOLOGY

## 2023-11-27 ENCOUNTER — AMBUL SURGICAL CARE (OUTPATIENT)
Dept: URBAN - NONMETROPOLITAN AREA SURGERY 1 | Facility: SURGERY | Age: 63
Setting detail: OPHTHALMOLOGY
End: 2023-11-27
Payer: COMMERCIAL

## 2023-11-27 DIAGNOSIS — H25.012: ICD-10-CM

## 2023-11-27 DIAGNOSIS — H25.12: ICD-10-CM

## 2023-11-27 PROCEDURE — V2788 PRESBYOPIA-CORRECT FUNCTION: HCPCS | Mod: GA | Performed by: OPHTHALMOLOGY

## 2023-11-27 PROCEDURE — G8907 PT DOC NO EVENTS ON DISCHARG: HCPCS | Mod: LT | Performed by: OPHTHALMOLOGY

## 2023-11-27 PROCEDURE — 66984 XCAPSL CTRC RMVL W/O ECP: CPT | Mod: 79,LT | Performed by: OPHTHALMOLOGY

## 2023-11-27 PROCEDURE — G8907 PT DOC NO EVENTS ON DISCHARG: HCPCS | Mod: LT | Performed by: CLINIC/CENTER

## 2023-11-27 PROCEDURE — 66984 XCAPSL CTRC RMVL W/O ECP: CPT | Mod: LT | Performed by: CLINIC/CENTER

## 2023-11-27 PROCEDURE — G8918 PT W/O PREOP ORDER IV AB PRO: HCPCS | Mod: LT | Performed by: OPHTHALMOLOGY

## 2023-11-27 PROCEDURE — G8918 PT W/O PREOP ORDER IV AB PRO: HCPCS | Mod: LT | Performed by: CLINIC/CENTER

## 2023-11-28 ENCOUNTER — DOCTOR'S OFFICE (OUTPATIENT)
Dept: URBAN - NONMETROPOLITAN AREA CLINIC 1 | Facility: CLINIC | Age: 63
Setting detail: OPHTHALMOLOGY
End: 2023-11-28

## 2023-11-28 DIAGNOSIS — Z96.1: ICD-10-CM

## 2023-11-28 PROBLEM — H25.12 CATARACT NUCLEAR SCLEROSIS; LEFT EYE: Status: RESOLVED | Noted: 2023-11-17 | Resolved: 2023-11-28

## 2023-11-28 PROCEDURE — 99024 POSTOP FOLLOW-UP VISIT: CPT

## 2023-11-28 ASSESSMENT — REFRACTION_AUTOREFRACTION
OS_AXIS: 020
OD_CYLINDER: -1.25
OD_AXIS: 176
OS_CYLINDER: -0.50
OS_SPHERE: +0.75
OD_SPHERE: +0.50

## 2023-11-28 ASSESSMENT — KERATOMETRY
OD_K1POWER_DIOPTERS: 42.75
OS_AXISANGLE_DEGREES: 127
OD_AXISANGLE_DEGREES: 114
OD_K2POWER_DIOPTERS: 44.00
OS_K2POWER_DIOPTERS: 44.25
OS_K1POWER_DIOPTERS: 43.75

## 2023-11-28 ASSESSMENT — AXIALLENGTH_DERIVED
OS_AL: 23.2194
OD_AL: 23.6869

## 2023-11-28 ASSESSMENT — REFRACTION_CURRENTRX
OD_OVR_VA: 20/
OS_OVR_VA: 20/

## 2023-11-28 ASSESSMENT — VISUAL ACUITY
OD_BCVA: 20/50-2
OS_BCVA: 20/25-1

## 2023-11-28 ASSESSMENT — CONFRONTATIONAL VISUAL FIELD TEST (CVF)
OD_FINDINGS: FULL
OS_FINDINGS: FULL

## 2023-11-28 ASSESSMENT — SPHEQUIV_DERIVED
OD_SPHEQUIV: -0.125
OS_SPHEQUIV: 0.5

## 2023-11-28 ASSESSMENT — TONOMETRY
OS_IOP_MMHG: 16
OD_IOP_MMHG: 14

## 2023-12-05 ENCOUNTER — RX ONLY (RX ONLY)
Age: 63
End: 2023-12-05

## 2023-12-05 ENCOUNTER — DOCTOR'S OFFICE (OUTPATIENT)
Dept: URBAN - NONMETROPOLITAN AREA CLINIC 1 | Facility: CLINIC | Age: 63
Setting detail: OPHTHALMOLOGY
End: 2023-12-05
Payer: COMMERCIAL

## 2023-12-05 DIAGNOSIS — Z96.1: ICD-10-CM

## 2023-12-05 PROCEDURE — 99024 POSTOP FOLLOW-UP VISIT: CPT | Performed by: OPHTHALMOLOGY

## 2023-12-05 ASSESSMENT — REFRACTION_AUTOREFRACTION
OD_CYLINDER: -1.25
OS_AXIS: 020
OS_SPHERE: +0.75
OD_SPHERE: +0.50
OS_CYLINDER: -0.50
OD_AXIS: 176

## 2023-12-05 ASSESSMENT — REFRACTION_CURRENTRX
OD_OVR_VA: 20/
OS_OVR_VA: 20/

## 2023-12-05 ASSESSMENT — CONFRONTATIONAL VISUAL FIELD TEST (CVF)
OS_FINDINGS: FULL
OD_FINDINGS: FULL

## 2023-12-05 ASSESSMENT — SPHEQUIV_DERIVED
OD_SPHEQUIV: -0.125
OS_SPHEQUIV: 0.5

## 2024-01-23 ENCOUNTER — RX ONLY (RX ONLY)
Age: 64
End: 2024-01-23

## 2024-01-23 ENCOUNTER — DOCTOR'S OFFICE (OUTPATIENT)
Dept: URBAN - NONMETROPOLITAN AREA CLINIC 1 | Facility: CLINIC | Age: 64
Setting detail: OPHTHALMOLOGY
End: 2024-01-23
Payer: COMMERCIAL

## 2024-01-23 DIAGNOSIS — H04.123: ICD-10-CM

## 2024-01-23 DIAGNOSIS — Z96.1: ICD-10-CM

## 2024-01-23 DIAGNOSIS — H11.151: ICD-10-CM

## 2024-01-23 PROCEDURE — 99024 POSTOP FOLLOW-UP VISIT: CPT | Performed by: OPHTHALMOLOGY

## 2024-01-23 ASSESSMENT — REFRACTION_CURRENTRX
OD_OVR_VA: 20/
OS_OVR_VA: 20/

## 2024-01-23 ASSESSMENT — SPHEQUIV_DERIVED
OD_SPHEQUIV: 1.625
OS_SPHEQUIV: 0.5

## 2024-01-23 ASSESSMENT — REFRACTION_AUTOREFRACTION
OD_AXIS: 090
OD_SPHERE: +2.50
OS_CYLINDER: -0.50
OD_CYLINDER: -1.75
OS_SPHERE: +0.75
OS_AXIS: 070

## 2024-01-23 ASSESSMENT — DRY EYES - PHYSICIAN NOTES
OS_GENERALCOMMENTS: TRACE PEE
OD_GENERALCOMMENTS: TRACE PEE

## 2024-01-23 ASSESSMENT — CONFRONTATIONAL VISUAL FIELD TEST (CVF)
OD_FINDINGS: FULL
OS_FINDINGS: FULL

## 2024-05-29 ENCOUNTER — DOCTOR'S OFFICE (OUTPATIENT)
Dept: URBAN - NONMETROPOLITAN AREA CLINIC 1 | Facility: CLINIC | Age: 64
Setting detail: OPHTHALMOLOGY
End: 2024-05-29
Payer: MEDICARE

## 2024-05-29 DIAGNOSIS — H11.151: ICD-10-CM

## 2024-05-29 DIAGNOSIS — H04.123: ICD-10-CM

## 2024-05-29 DIAGNOSIS — H35.373: ICD-10-CM

## 2024-05-29 PROCEDURE — 92134 CPTRZ OPH DX IMG PST SGM RTA: CPT | Performed by: OPHTHALMOLOGY

## 2024-05-29 PROCEDURE — 99213 OFFICE O/P EST LOW 20 MIN: CPT | Performed by: OPHTHALMOLOGY

## 2024-05-29 ASSESSMENT — CONFRONTATIONAL VISUAL FIELD TEST (CVF)
OS_FINDINGS: FULL
OD_FINDINGS: FULL